# Patient Record
Sex: FEMALE | Race: WHITE | Employment: UNEMPLOYED | URBAN - METROPOLITAN AREA
[De-identification: names, ages, dates, MRNs, and addresses within clinical notes are randomized per-mention and may not be internally consistent; named-entity substitution may affect disease eponyms.]

---

## 2017-02-03 ENCOUNTER — OFFICE VISIT (OUTPATIENT)
Dept: OBGYN | Facility: CLINIC | Age: 32
End: 2017-02-03
Payer: COMMERCIAL

## 2017-02-03 VITALS
HEART RATE: 64 BPM | BODY MASS INDEX: 22.68 KG/M2 | DIASTOLIC BLOOD PRESSURE: 73 MMHG | HEIGHT: 63 IN | TEMPERATURE: 97.2 F | WEIGHT: 128 LBS | SYSTOLIC BLOOD PRESSURE: 119 MMHG | OXYGEN SATURATION: 97 %

## 2017-02-03 DIAGNOSIS — Z13.220 SCREENING FOR LIPOID DISORDERS: ICD-10-CM

## 2017-02-03 DIAGNOSIS — Z13.1 SCREENING FOR DIABETES MELLITUS: ICD-10-CM

## 2017-02-03 DIAGNOSIS — Z01.419 ENCOUNTER FOR GYNECOLOGICAL EXAMINATION WITHOUT ABNORMAL FINDING: Primary | ICD-10-CM

## 2017-02-03 LAB
ERYTHROCYTE [DISTWIDTH] IN BLOOD BY AUTOMATED COUNT: 13.3 % (ref 10–15)
HBA1C MFR BLD: 5.4 % (ref 4.3–6)
HCT VFR BLD AUTO: 41.2 % (ref 35–47)
HGB BLD-MCNC: 13.5 G/DL (ref 11.7–15.7)
MCH RBC QN AUTO: 29.9 PG (ref 26.5–33)
MCHC RBC AUTO-ENTMCNC: 32.8 G/DL (ref 31.5–36.5)
MCV RBC AUTO: 91 FL (ref 78–100)
PLATELET # BLD AUTO: 273 10E9/L (ref 150–450)
RBC # BLD AUTO: 4.51 10E12/L (ref 3.8–5.2)
WBC # BLD AUTO: 6.3 10E9/L (ref 4–11)

## 2017-02-03 PROCEDURE — 83036 HEMOGLOBIN GLYCOSYLATED A1C: CPT | Performed by: OBSTETRICS & GYNECOLOGY

## 2017-02-03 PROCEDURE — 80061 LIPID PANEL: CPT | Performed by: OBSTETRICS & GYNECOLOGY

## 2017-02-03 PROCEDURE — 85027 COMPLETE CBC AUTOMATED: CPT | Performed by: OBSTETRICS & GYNECOLOGY

## 2017-02-03 PROCEDURE — 82947 ASSAY GLUCOSE BLOOD QUANT: CPT | Performed by: OBSTETRICS & GYNECOLOGY

## 2017-02-03 PROCEDURE — 99395 PREV VISIT EST AGE 18-39: CPT | Performed by: OBSTETRICS & GYNECOLOGY

## 2017-02-03 PROCEDURE — 36415 COLL VENOUS BLD VENIPUNCTURE: CPT | Performed by: OBSTETRICS & GYNECOLOGY

## 2017-02-03 RX ORDER — NORGESTIMATE AND ETHINYL ESTRADIOL 0.25-0.035
1 KIT ORAL DAILY
Qty: 84 TABLET | Refills: 3 | Status: SHIPPED | OUTPATIENT
Start: 2017-02-03 | End: 2017-12-26

## 2017-02-04 LAB
CHOLEST SERPL-MCNC: 170 MG/DL
GLUCOSE SERPL-MCNC: 77 MG/DL (ref 70–99)
HDLC SERPL-MCNC: 38 MG/DL
LDLC SERPL CALC-MCNC: 105 MG/DL
NONHDLC SERPL-MCNC: 132 MG/DL
TRIGL SERPL-MCNC: 133 MG/DL

## 2017-12-26 DIAGNOSIS — Z01.419 ENCOUNTER FOR GYNECOLOGICAL EXAMINATION WITHOUT ABNORMAL FINDING: ICD-10-CM

## 2017-12-26 RX ORDER — NORGESTIMATE AND ETHINYL ESTRADIOL 0.25-0.035
1 KIT ORAL DAILY
Qty: 84 TABLET | Refills: 3 | Status: SHIPPED | OUTPATIENT
Start: 2017-12-26 | End: 2019-02-04

## 2018-01-05 ENCOUNTER — OFFICE VISIT (OUTPATIENT)
Dept: OBGYN | Facility: CLINIC | Age: 33
End: 2018-01-05
Payer: COMMERCIAL

## 2018-01-05 VITALS
TEMPERATURE: 97.4 F | DIASTOLIC BLOOD PRESSURE: 81 MMHG | SYSTOLIC BLOOD PRESSURE: 131 MMHG | WEIGHT: 127.4 LBS | OXYGEN SATURATION: 98 % | HEART RATE: 85 BPM | HEIGHT: 63 IN | BODY MASS INDEX: 22.57 KG/M2

## 2018-01-05 DIAGNOSIS — Z01.419 ENCOUNTER FOR GYNECOLOGICAL EXAMINATION WITHOUT ABNORMAL FINDING: Primary | ICD-10-CM

## 2018-01-05 PROCEDURE — 99395 PREV VISIT EST AGE 18-39: CPT | Performed by: OBSTETRICS & GYNECOLOGY

## 2018-01-05 RX ORDER — NORGESTIMATE AND ETHINYL ESTRADIOL 7DAYSX3 28
1 KIT ORAL DAILY
Qty: 84 TABLET | Refills: 3 | Status: SHIPPED | OUTPATIENT
Start: 2018-01-05 | End: 2018-12-17

## 2018-01-05 NOTE — PROGRESS NOTES
Ella is a 32 year old  female who presents for annual exam.     Menses are regular q 28-30 days and normal lasting 3-7 days.  Menses flow: normal.  Patient's last menstrual period was 2017.. Using oral contraceptives for contraception.  She is not currently considering pregnancy.  Besides routine health maintenance, she has no other health concerns today .  Has been bleeding on last week of active tablets on OCP so not sure why.  Boys are now in 4th grade, 4 y/o and 3 y/o and all doing well.  GYNECOLOGIC HISTORY:  Menarche:   Ella is sexually active with 1male partner(s) and is currently in monogamous relationship.    History sexually transmitted infections:Trichomonas  STI testing offered?  Declined  KANA exposure: Unknown  History of abnormal Pap smear: NO - age 30- 65 PAP every 3 years recommended  Family history of breast CA: Yes (Please explain): mgm  Family history of uterine/ovarian CA: No    Family history of colon CA: No    HEALTH MAINTENANCE:  Cholesterol: (  Cholesterol   Date Value Ref Range Status   2017 170 <200 mg/dL Final   2014 164 <200 mg/dL Final     Comment:     LDL Cholesterol is the primary guide to therapy.   The NCEP recommends further evaluation of: patients with cholesterol greater   than 200 mg/dL if additional risk factors are present, cholesterol greater   than   240 mg/dL, triglycerides greater than 150 mg/dL, or HDL less than 40 mg/dL.      History of abnormal lipids: Yes  Mammo: na . History of abnormal Mammo: Not applicable.  Regular Self Breast Exams: No  Calcium/Vitamin D intake: source:  dairy Adequate? Yes  TSH: (No results found for: TSH )  Pap; (  Lab Results   Component Value Date    PAP NIL 2015    PAP NIL 2012    )    HISTORY:  Obstetric History       T3      L3     SAB0   TAB0   Ectopic1   Multiple0   Live Births3       # Outcome Date GA Lbr Rakesh/2nd Weight Sex Delivery Anes PTL Lv   4 Term 11/23/15 37w2d  7 lb 10 oz  (3.459 kg) M CS-LTranv Spinal N LYN      Name: Joe      Apgar1:  8                Apgar5: 8   3 Term 12 37w3d  8 lb 8 oz (3.856 kg) M CS-LTranv Spinal N LYN      Name: Cristian      Apgar1:  8                Apgar5: 9   2 Ectopic 11           1 Term  40w0d  8 lb 2 oz (3.685 kg) M Vag-Vacuum EPI  LYN      Name: Constantine        Past Medical History:   Diagnosis Date     Diabetes mellitus of mother, complicating pregnancy, childbirth, or the puerperium, unspecified as to episode of care(648.00) 2015    Gestational diabetes--glyburide     Past Surgical History:   Procedure Laterality Date      SECTION  2012    Procedure:  SECTION;   section ;  Surgeon: Isabel Chaparro MD;  [facility]      SECTION N/A 2015    Procedure:  SECTION;  Surgeon: Isabel Chaparro MD;  [facility]     LAPAROSCOPY DIAGNOSTIC (GYN)  2011    Procedure:LAPAROSCOPY DIAGNOSTIC (GYN); Surgeon:ISABEL CHAPARRO; Location:[facility]     LAPAROTOMY EXPLORATORY  2011    cornual ectopic with wedge resection     Family History   Problem Relation Age of Onset     C.A.D. Paternal Grandfather      CEREBROVASCULAR DISEASE Paternal Grandfather      Breast Cancer Maternal Grandmother      in later 50's     Social History     Social History     Marital status:      Spouse name: N/A     Number of children: 3     Years of education: N/A     Occupational History      None      Social History Main Topics     Smoking status: Never Smoker     Smokeless tobacco: Never Used     Alcohol use No     Drug use: No     Sexual activity: Yes     Partners: Male     Birth control/ protection:      Other Topics Concern     Not on file         Current Outpatient Prescriptions:      norgestimate-ethinyl estradiol (ORTHO-CYCLEN, SPRINTEC) 0.25-35 MG-MCG per tablet, Take 1 tablet by mouth daily, Disp: 84 tablet, Rfl: 3  No current facility-administered medications for this visit.  "    Facility-Administered Medications Ordered in Other Visits:      bupivacaine 0.25 % - EPINEPHrine 1:200,000 injection, , , PRN, Weiland, Brian Forrester MD, 40 mL at 11/23/15 1000   No Known Allergies    Past medical, surgical, social and family history were reviewed and updated in EPIC.      EXAM:  /81  Pulse 85  Temp 97.4  F (36.3  C) (Oral)  Ht 5' 3\" (1.6 m)  Wt 127 lb 6.4 oz (57.8 kg)  LMP 01/05/2017  SpO2 98%  BMI 22.57 kg/m2   BMI: Body mass index is 22.57 kg/(m^2).  Constitutional: healthy, alert and no distress  Head: Normocephalic. No masses, lesions, tenderness or abnormalities  Neck: Neck supple. Trachea midline. No adenopathy. Thyroid symmetric, normal size.   Cardiovascular: RRR.   Respiratory: Negative.   Breast: No nodularity, asymmetry or nipple discharge bilaterally.  S/p implants bilaterally  Gastrointestinal: Abdomen soft, non-tender, non-distended. No masses, organomegaly.  :  Vulva:  No external lesions, normal female hair distribution, no inguinal adenopathy.    Urethra:  Midline, non-tender, well supported, no discharge  Vagina:  Moist, pink, no abnormal discharge, no lesions  Uterus:  Normal size , non-tender, freely mobile  Ovaries:  No masses appreciated, non-tender, mobile  Rectal Exam: deferred  Musculoskeletal: extremities normal  Skin: no suspicious lesions or rashes  Psychiatric: Affect appropriate, cooperative,mentation appears normal.     COUNSELING:   Reviewed preventive health counseling, as reflected in patient instructions       Contraception   reports that she has never smoked. She has never used smokeless tobacco.    Body mass index is 22.57 kg/(m^2).      FRAX Risk Assessment    ASSESSMENT:  32 year old female with satisfactory annual exam  (Z01.419) Encounter for gynecological examination without abnormal finding  (primary encounter diagnosis)  Comment: OCP  Plan: norgestim-eth estrad triphasic (TRINESSA, 28,)         0.18/0.215/0.25 MG-35 MCG per tablet      "   Will try a different OCP to see if works better at preventing bleeding at strange times and she will let me know if not working.    Pap due next year.  Labs reviewed, will need repeat fasting cholesterol done next year or year after.       Isabel Chaparro MD

## 2018-01-05 NOTE — MR AVS SNAPSHOT
"              After Visit Summary   1/5/2018    Ella Palacios    MRN: [id number]           Patient Information     Date Of Birth          1985        Visit Information        Provider Department      1/5/2018 11:30 AM Isabel Chaparro [place] [facility] [place]        Today's Diagnoses     Encounter for gynecological examination without abnormal finding    -  1       Follow-ups after your visit        Who to contact     If you have questions or need follow up information about today's clinic visit or your schedule please contact [facility] directly at [phone].  Normal or non-critical lab and imaging results will be communicated to you by Solaveihart, letter or phone within 4 business days after the clinic has received the results. If you do not hear from us within 7 days, please contact the clinic through Solaveihart or phone. If you have a critical or abnormal lab result, we will notify you by phone as soon as possible.  Submit refill requests through Easy Eye or call your pharmacy and they will forward the refill request to us. Please allow 3 business days for your refill to be completed.          Additional Information About Your Visit        MyChart Information     Easy Eye gives you secure access to your electronic health record. If you see a primary care provider, you can also send messages to your care team and make appointments. If you have questions, please call your primary care clinic.  If you do not have a primary care provider, please call [phone] and they will assist you.        Care EveryWhere ID     This is your Care EveryWhere ID. This could be used by other organizations to access your [place] medical records  [id number]        Your Vitals Were     Pulse Temperature Height Last Period Pulse Oximetry BMI (Body Mass Index)    85 97.4  F (36.3  C) (Oral) 5' 3\" (1.6 m) 01/05/2017 98% 22.57 kg/m2       Blood Pressure from Last 3 Encounters:   01/05/18 131/81 "   02/03/17 119/73   12/28/15 115/69    Weight from Last 3 Encounters:   01/05/18 127 lb 6.4 oz (57.8 kg)   02/03/17 128 lb (58.1 kg)   12/28/15 124 lb 9.6 oz (56.5 kg)              Today, you had the following     No orders found for display         Today's Medication Changes          These changes are accurate as of: 1/5/18 12:01 PM.  If you have any questions, ask your nurse or doctor.               Start taking these medicines.        Dose/Directions    norgestim-eth estrad triphasic 0.18/0.215/0.25 MG-35 MCG per tablet   Commonly known as:  TRINESSA (28)   Used for:  Encounter for gynecological examination without abnormal finding   Started by:  Isabel Chaparro MD        Dose:  1 tablet   Take 1 tablet by mouth daily   Quantity:  84 tablet   Refills:  3            Where to get your medicines      These medications were sent to [facility]  [place] - [address] AT [facility]  [address] [place] [address] [place] [address]     Phone:  [phone]     norgestim-eth estrad triphasic 0.18/0.215/0.25 MG-35 MCG per tablet                Primary Care Provider Fax #    Physician No Ref-Primary [phone]       No address on file        Equal Access to Services     JESUS COOPER AH: Riddhi villalta Solashay, waaxda luqadaha, qaybta kaalclari cárdenas. So [facility] [phone].    ATENCIÓN: Si habla español, tiene a asher disposición servicios gratuitos de asistencia lingüística. Aisha al [phone].    We comply with applicable federal civil rights laws and [place] laws. We do not discriminate on the basis of race, color, national origin, age, disability, sex, sexual orientation, or gender identity.            Thank you!     Thank you for choosing [facility]  for your care. Our goal is always to provide you with excellent care. Hearing back from our patients is one way we can continue to improve our services. Please take a  few minutes to complete the written survey that you may receive in the mail after your visit with us. Thank you!             Your Updated Medication List - Protect others around you: Learn how to safely use, store and throw away your medicines at www.disposemymeds.org.          This list is accurate as of: 1/5/18 12:01 PM.  Always use your most recent med list.                   Brand Name Dispense Instructions for use Diagnosis    norgestim-eth estrad triphasic 0.18/0.215/0.25 MG-35 MCG per tablet    TRINESSA (28)    84 tablet    Take 1 tablet by mouth daily    Encounter for gynecological examination without abnormal finding       norgestimate-ethinyl estradiol 0.25-35 MG-MCG per tablet    ORTHO-CYCLEN, SPRINTEC    84 tablet    Take 1 tablet by mouth daily    Encounter for gynecological examination without abnormal finding

## 2018-01-05 NOTE — NURSING NOTE
"Chief Complaint   Patient presents with     Physical       Initial /81  Pulse 85  Temp 97.4  F (36.3  C) (Oral)  Ht 5' 3\" (1.6 m)  Wt 127 lb 6.4 oz (57.8 kg)  LMP 2017  SpO2 98%  BMI 22.57 kg/m2 Estimated body mass index is 22.57 kg/(m^2) as calculated from the following:    Height as of this encounter: 5' 3\" (1.6 m).    Weight as of this encounter: 127 lb 6.4 oz (57.8 kg).  BP completed using cuff size: regular        The following HM Due: NONE      The following patient reported/Care Every where data was sent to:  P ABSTRACT QUALITY INITIATIVES [[id number]]  none      patient has appointment for today              "

## 2018-07-23 ENCOUNTER — HEALTH MAINTENANCE LETTER (OUTPATIENT)
Age: 33
End: 2018-07-23

## 2018-12-17 DIAGNOSIS — Z01.419 ENCOUNTER FOR GYNECOLOGICAL EXAMINATION WITHOUT ABNORMAL FINDING: ICD-10-CM

## 2018-12-17 RX ORDER — NORGESTIMATE AND ETHINYL ESTRADIOL 7DAYSX3 28
1 KIT ORAL DAILY
Qty: 84 TABLET | Refills: 0 | Status: SHIPPED | OUTPATIENT
Start: 2018-12-17 | End: 2019-02-04

## 2018-12-17 NOTE — TELEPHONE ENCOUNTER
Pending Prescriptions:                       Disp   Refills    norgestim-eth estrad triphasic (TRINESSA,*84 tab*0            Sig: Take 1 tablet by mouth daily    Last OV was 1/5/18. Pt has AE scheduled for 2/4/19. Refill sent to pharmacy.   Paz Jessica, RN-BSN

## 2019-02-04 ENCOUNTER — OFFICE VISIT (OUTPATIENT)
Dept: OBGYN | Facility: CLINIC | Age: 34
End: 2019-02-04
Payer: COMMERCIAL

## 2019-02-04 VITALS
HEART RATE: 62 BPM | WEIGHT: 126 LBS | SYSTOLIC BLOOD PRESSURE: 117 MMHG | HEIGHT: 63 IN | DIASTOLIC BLOOD PRESSURE: 73 MMHG | BODY MASS INDEX: 22.32 KG/M2 | OXYGEN SATURATION: 93 %

## 2019-02-04 DIAGNOSIS — Z12.4 SCREENING FOR CERVICAL CANCER: ICD-10-CM

## 2019-02-04 DIAGNOSIS — Z01.419 ENCOUNTER FOR GYNECOLOGICAL EXAMINATION WITHOUT ABNORMAL FINDING: Primary | ICD-10-CM

## 2019-02-04 PROCEDURE — G0476 HPV COMBO ASSAY CA SCREEN: HCPCS | Performed by: OBSTETRICS & GYNECOLOGY

## 2019-02-04 PROCEDURE — 99395 PREV VISIT EST AGE 18-39: CPT | Performed by: OBSTETRICS & GYNECOLOGY

## 2019-02-04 PROCEDURE — G0145 SCR C/V CYTO,THINLAYER,RESCR: HCPCS | Performed by: OBSTETRICS & GYNECOLOGY

## 2019-02-04 RX ORDER — NORGESTIMATE AND ETHINYL ESTRADIOL 7DAYSX3 28
1 KIT ORAL DAILY
Qty: 84 TABLET | Refills: 3 | Status: SHIPPED | OUTPATIENT
Start: 2019-02-04 | End: 2019-11-06

## 2019-02-04 ASSESSMENT — ANXIETY QUESTIONNAIRES
3. WORRYING TOO MUCH ABOUT DIFFERENT THINGS: NOT AT ALL
GAD7 TOTAL SCORE: 0
6. BECOMING EASILY ANNOYED OR IRRITABLE: NOT AT ALL
7. FEELING AFRAID AS IF SOMETHING AWFUL MIGHT HAPPEN: NOT AT ALL
5. BEING SO RESTLESS THAT IT IS HARD TO SIT STILL: NOT AT ALL
IF YOU CHECKED OFF ANY PROBLEMS ON THIS QUESTIONNAIRE, HOW DIFFICULT HAVE THESE PROBLEMS MADE IT FOR YOU TO DO YOUR WORK, TAKE CARE OF THINGS AT HOME, OR GET ALONG WITH OTHER PEOPLE: NOT DIFFICULT AT ALL
2. NOT BEING ABLE TO STOP OR CONTROL WORRYING: NOT AT ALL
1. FEELING NERVOUS, ANXIOUS, OR ON EDGE: NOT AT ALL

## 2019-02-04 ASSESSMENT — PATIENT HEALTH QUESTIONNAIRE - PHQ9
5. POOR APPETITE OR OVEREATING: NOT AT ALL
SUM OF ALL RESPONSES TO PHQ QUESTIONS 1-9: 0

## 2019-02-04 ASSESSMENT — MIFFLIN-ST. JEOR: SCORE: 1240.66

## 2019-02-04 NOTE — NURSING NOTE
"Chief Complaint   Patient presents with     Physical       Initial /73 (BP Location: Left arm, Patient Position: Sitting, Cuff Size: Adult Regular)   Pulse 62   Ht 1.6 m (5' 3\")   Wt 57.2 kg (126 lb)   LMP 2019   SpO2 93%   Breastfeeding? No   BMI 22.32 kg/m   Estimated body mass index is 22.32 kg/m  as calculated from the following:    Height as of this encounter: 1.6 m (5' 3\").    Weight as of this encounter: 57.2 kg (126 lb).  BP completed using cuff size: regular    Questioned patient about current smoking habits.  Pt. has never smoked.          The following HM Due: pap smear      The following patient reported/Care Every where data was sent to:  P ABSTRACT QUALITY INITIATIVES [[id number]]  N/a       patient has appointment for today    Nisha Nelson MA on 2019 at 11:40 AM                "

## 2019-02-04 NOTE — PROGRESS NOTES
Ella is a 32 year old  female who presents for annual exam.      Menses are regular q 28-30 days and normal lasting 3-7 days.  Menses flow: normal.  Patient's last menstrual period was 2018. Using oral contraceptives for contraception.  She is not currently considering pregnancy.  Besides routine health maintenance, she has no other health concerns today .  This birth control pill is working better than the last one and needs a refill.  Boys are now in 5th grade, 7 y/o and 3 y/o and all doing well.  She is doing well and happy.  GYNECOLOGIC HISTORY:  Menarche: 11   Age or 1st intercourse: 15 Lifetime partners: 02  Ella is sexually active with 1male partner(s) and is currently in monogamous relationship.    History sexually transmitted infections:Trichomonas  STI testing offered?  Declined  KANA exposure: Unknown  History of abnormal Pap smear: NO - age 30- 65 PAP every 3 years recommended  Family history of breast CA: Yes (Please explain): mgm  Family history of uterine/ovarian CA: No     Family history of colon CA: No  HEALTH MAINTENANCE:  Cholesterol: (  Cholesterol   Date Value Ref Range Status   2017 170 <200 mg/dL Final   2014 164 <200 mg/dL Final     Comment:     LDL Cholesterol is the primary guide to therapy.   The NCEP recommends further evaluation of: patients with cholesterol greater   than 200 mg/dL if additional risk factors are present, cholesterol greater   than   240 mg/dL, triglycerides greater than 150 mg/dL, or HDL less than 40 mg/dL.      History of abnormal lipids: Yes  Mammo: n/a . History of abnormal Mammo: No.  Regular Self Breast Exams: No  Calcium/Vitamin D intake: source:  dairy Adequate? Yes  TSH: (No results found for: TSH )  Pap; (  Lab Results   Component Value Date    PAP NIL 2015    PAP NIL 2012    )    HISTORY:  Obstetric History       T3      L3     SAB0   TAB0   Ectopic1   Multiple0   Live Births3       # Outcome Date GA  Lbr Rakesh/2nd Weight Sex Delivery Anes PTL Lv   4 Term 11/23/15 37w2d  3.459 kg (7 lb 10 oz) M CS-LTranv Spinal N LYN      Name: Joe      Apgar1:  8                Apgar5: 8   3 Term 12 37w3d  3.856 kg (8 lb 8 oz) M CS-LTranv Spinal N LYN      Name: Cristian      Apgar1:  8                Apgar5: 9   2 Ectopic 11           1 Term  40w0d  3.685 kg (8 lb 2 oz) M Vag-Vacuum EPI  LYN      Name: Constantine        Past Medical History:   Diagnosis Date     Diabetes mellitus of mother, complicating pregnancy, childbirth, or the puerperium, unspecified as to episode of care(648.00) 2015    Gestational diabetes--glyburide     Past Surgical History:   Procedure Laterality Date     C BREAST AUGMENTATION  2017    silicone      SECTION  2012    Procedure:  SECTION;   section ;  Surgeon: Isabel Chaparro MD;  [facility]      SECTION N/A 2015    Procedure:  SECTION;  Surgeon: Isabel Chaparro MD;  [facility]     LAPAROSCOPY DIAGNOSTIC (GYN)  2011    Procedure:LAPAROSCOPY DIAGNOSTIC (GYN); Surgeon:ISABEL CHAPARRO; [facility]     LAPAROTOMY EXPLORATORY  2011    cornual ectopic with wedge resection     Family History   Problem Relation Age of Onset     C.A.D. 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 Not Asked   Social History Narrative                               Current Outpatient Medications:      norgestim-eth estrad triphasic (TRINESSA, 28,) 0.18/0.215/0.25 MG-35 MCG tablet, Take 1 tablet by mouth daily, Disp: 84 tablet, Rfl: 3  No current facility-administered medications for this visit.     Facility-Administered Medications Ordered in Other Visits:      bupivacaine 0.25 % - EPINEPHrine 1:200,000 injection, , , PRN, Weiland, Brian Forrester MD, 40 mL at 11/23/15 1000   No Known Allergies    Past medical, surgical, social and family history were reviewed and updated in EPIC.      EXAM:  /73 (BP Location: Left arm, Patient Position: Sitting, Cuff Size: Adult Regular)   Pulse 62   Ht 1.6 m (5' 3\")   Wt 57.2 kg (126 lb)   LMP 02/03/2019   SpO2 93%   Breastfeeding? No   BMI 22.32 kg/m     BMI: Body mass index is 22.32 kg/m .  Constitutional: healthy, alert and no distress  Head: Normocephalic. No masses, lesions, tenderness or abnormalities  Neck: Neck supple. Trachea midline. No adenopathy. Thyroid symmetric, normal size.   Cardiovascular: RRR.   Respiratory: Negative.   Breast: No nodularity, asymmetry or nipple discharge bilaterally. S/p implants bilaterally  Gastrointestinal: Abdomen soft, non-tender, non-distended. No masses, organomegaly.  :  Vulva:  No external lesions, normal female hair distribution, no inguinal adenopathy.    Urethra:  Midline, non-tender, well supported, no discharge  Vagina:  Moist, pink, blood from menses, no lesions  Uterus:  Normal size , non-tender, freely mobile  Ovaries:  No masses appreciated, non-tender, mobile  Rectal Exam: deferred  Musculoskeletal: extremities normal  Skin: no suspicious lesions or rashes  Psychiatric: Affect " appropriate, cooperative,mentation appears normal.     COUNSELING:   Reviewed preventive health counseling, as reflected in patient instructions   reports that  has never smoked. she has never used smokeless tobacco.    Body mass index is 22.32 kg/m .    FRAX Risk Assessment    ASSESSMENT:  33 year old female with satisfactory annual exam  (Z01.419) Encounter for gynecological examination without abnormal finding  (primary encounter diagnosis)  Comment: OCP  Plan: norgestim-eth estrad triphasic (TRINESSA, 28,)         0.18/0.215/0.25 MG-35 MCG tablet        Refill of pill was done.    Will need fasting labs done in 2020 including cholesterol and glucose for history of GDM.    (Z12.4) Screening for cervical cancer  Plan: Pap imaged thin layer screen with HPV -         recommended age 30 - 65 years (select HPV order        below), HPV High Risk Types DNA Cervical        Discussed sending pap smear for HPV typing as well and that if both pap and HPV are negative, then can have q5 year pap smears.      Isabel Chaparro MD

## 2019-02-05 ASSESSMENT — ANXIETY QUESTIONNAIRES: GAD7 TOTAL SCORE: 0

## 2019-02-06 LAB
COPATH REPORT: NORMAL
PAP: NORMAL

## 2019-02-07 LAB
FINAL DIAGNOSIS: NORMAL
HPV HR 12 DNA CVX QL NAA+PROBE: NEGATIVE
HPV16 DNA SPEC QL NAA+PROBE: NEGATIVE
HPV18 DNA SPEC QL NAA+PROBE: NEGATIVE
SPECIMEN DESCRIPTION: NORMAL
SPECIMEN SOURCE CVX/VAG CYTO: NORMAL

## 2019-11-06 DIAGNOSIS — Z01.419 ENCOUNTER FOR GYNECOLOGICAL EXAMINATION WITHOUT ABNORMAL FINDING: ICD-10-CM

## 2019-11-06 RX ORDER — NORGESTIMATE AND ETHINYL ESTRADIOL 7DAYSX3 28
1 KIT ORAL DAILY
Qty: 84 TABLET | Refills: 1 | Status: SHIPPED | OUTPATIENT
Start: 2019-11-06 | End: 2020-02-10

## 2019-11-06 NOTE — TELEPHONE ENCOUNTER
Signed Prescriptions:                        Disp   Refills    norgestim-eth estrad triphasic (TRINESSA, *84 tab*1        Sig: Take 1 tablet by mouth daily  Authorizing Provider: SUNIL COOLEY  Ordering User: CELINE GUILLEN    Up to date on AE - rx sent.  Celine Guillen RN

## 2020-02-10 ENCOUNTER — TELEPHONE (OUTPATIENT)
Dept: OBGYN | Facility: CLINIC | Age: 35
End: 2020-02-10

## 2020-02-10 DIAGNOSIS — Z01.419 ENCOUNTER FOR GYNECOLOGICAL EXAMINATION WITHOUT ABNORMAL FINDING: ICD-10-CM

## 2020-02-10 RX ORDER — NORGESTIMATE AND ETHINYL ESTRADIOL 7DAYSX3 28
1 KIT ORAL DAILY
Qty: 84 TABLET | Refills: 0 | Status: SHIPPED | OUTPATIENT
Start: 2020-02-10 | End: 2020-02-21

## 2020-02-10 NOTE — TELEPHONE ENCOUNTER
Reason for call:  Other   Patient called regarding (reason for call): call back  Additional comments: Patient called and would like a prescription for birth control pills. Please give patient a call back    Phone number to reach patient:  Cell number on file:    Telephone Information:   Mobile [phone]       Best Time:  na    Can we leave a detailed message on this number?  YES

## 2020-02-10 NOTE — TELEPHONE ENCOUNTER
Signed Prescriptions:                        Disp   Refills    norgestim-eth estrad triphasic (TRINESSA, *84 tab*0        Sig: Take 1 tablet by mouth daily  Authorizing Provider: SUNIL COOLEY  Ordering User: ANJUM JEFFERSON    Last OV was 2/4/19. Pt is scheduled for AE on 2/21/20. TC to patient. Refill sent to pharmacy.   Anjum Jefferson, RN-BSN

## 2020-02-21 ENCOUNTER — OFFICE VISIT (OUTPATIENT)
Dept: OBGYN | Facility: CLINIC | Age: 35
End: 2020-02-21
Payer: COMMERCIAL

## 2020-02-21 VITALS
HEIGHT: 63 IN | OXYGEN SATURATION: 99 % | DIASTOLIC BLOOD PRESSURE: 80 MMHG | SYSTOLIC BLOOD PRESSURE: 117 MMHG | HEART RATE: 78 BPM | WEIGHT: 138 LBS | BODY MASS INDEX: 24.45 KG/M2

## 2020-02-21 DIAGNOSIS — Z13.220 SCREENING FOR LIPID DISORDERS: ICD-10-CM

## 2020-02-21 DIAGNOSIS — Z01.419 ENCOUNTER FOR GYNECOLOGICAL EXAMINATION WITHOUT ABNORMAL FINDING: Primary | ICD-10-CM

## 2020-02-21 DIAGNOSIS — Z13.1 SCREENING FOR DIABETES MELLITUS: ICD-10-CM

## 2020-02-21 DIAGNOSIS — Z13.29 SCREENING FOR THYROID DISORDER: ICD-10-CM

## 2020-02-21 LAB
CHOLEST SERPL-MCNC: 213 MG/DL
ERYTHROCYTE [DISTWIDTH] IN BLOOD BY AUTOMATED COUNT: 13 % (ref 10–15)
GLUCOSE SERPL-MCNC: 86 MG/DL (ref 70–99)
HBA1C MFR BLD: 5.5 % (ref 0–5.6)
HCT VFR BLD AUTO: 40.1 % (ref 35–47)
HDLC SERPL-MCNC: 45 MG/DL
HGB BLD-MCNC: 13.4 G/DL (ref 11.7–15.7)
LDLC SERPL CALC-MCNC: 133 MG/DL
MCH RBC QN AUTO: 30.1 PG (ref 26.5–33)
MCHC RBC AUTO-ENTMCNC: 33.4 G/DL (ref 31.5–36.5)
MCV RBC AUTO: 90 FL (ref 78–100)
NONHDLC SERPL-MCNC: 168 MG/DL
PLATELET # BLD AUTO: 268 10E9/L (ref 150–450)
RBC # BLD AUTO: 4.45 10E12/L (ref 3.8–5.2)
TRIGL SERPL-MCNC: 173 MG/DL
TSH SERPL DL<=0.005 MIU/L-ACNC: 1.72 MU/L (ref 0.4–4)
WBC # BLD AUTO: 7.7 10E9/L (ref 4–11)

## 2020-02-21 PROCEDURE — 87491 CHLMYD TRACH DNA AMP PROBE: CPT | Performed by: OBSTETRICS & GYNECOLOGY

## 2020-02-21 PROCEDURE — 36415 COLL VENOUS BLD VENIPUNCTURE: CPT | Performed by: OBSTETRICS & GYNECOLOGY

## 2020-02-21 PROCEDURE — 80061 LIPID PANEL: CPT | Performed by: OBSTETRICS & GYNECOLOGY

## 2020-02-21 PROCEDURE — 87591 N.GONORRHOEAE DNA AMP PROB: CPT | Performed by: OBSTETRICS & GYNECOLOGY

## 2020-02-21 PROCEDURE — 85027 COMPLETE CBC AUTOMATED: CPT | Performed by: OBSTETRICS & GYNECOLOGY

## 2020-02-21 PROCEDURE — 99395 PREV VISIT EST AGE 18-39: CPT | Performed by: OBSTETRICS & GYNECOLOGY

## 2020-02-21 PROCEDURE — 84443 ASSAY THYROID STIM HORMONE: CPT | Performed by: OBSTETRICS & GYNECOLOGY

## 2020-02-21 PROCEDURE — 82947 ASSAY GLUCOSE BLOOD QUANT: CPT | Performed by: OBSTETRICS & GYNECOLOGY

## 2020-02-21 PROCEDURE — 83036 HEMOGLOBIN GLYCOSYLATED A1C: CPT | Performed by: OBSTETRICS & GYNECOLOGY

## 2020-02-21 RX ORDER — NORGESTIMATE AND ETHINYL ESTRADIOL 7DAYSX3 28
1 KIT ORAL DAILY
Qty: 84 TABLET | Refills: 4 | Status: SHIPPED | OUTPATIENT
Start: 2020-02-21 | End: 2020-07-14

## 2020-02-21 ASSESSMENT — MIFFLIN-ST. JEOR: SCORE: 1285.09

## 2020-02-21 NOTE — PROGRESS NOTES
Ella is a 35 year old  female who presents for annual exam.     Menses are regular q 28-30 days and normal lasting 7 days.  Menses flow: normal.  No LMP recorded.. Using oral contraceptives for contraception.  She is not currently considering pregnancy.  Besides routine health maintenance, she has no other health concerns today .  Boys are now 12, 7 and 4 years old.  Is fasting today for lab work and would also like gonorrhea and Chlamydia testing as she and her spouse are still having some issues.  Weight is up 12 pounds since last visit and she is trying to work on that.  Doing well on OCP.  GYNECOLOGIC HISTORY:  Menarche:   Ella is sexually active with 1male partner(s) and is currently in monogamous relationship.    History sexually transmitted infections:Trichomonas  STI testing offered?  Accepted  KANA exposure: Unknown  History of abnormal Pap smear: NO - age 30-65 PAP every 5 years with negative HPV co-testing recommended  Family history of breast CA: Yes (Please explain): mgm  Family history of uterine/ovarian CA: No    Family history of colon CA: No    HEALTH MAINTENANCE:  Cholesterol: (  Cholesterol   Date Value Ref Range Status   2017 170 <200 mg/dL Final   2014 164 <200 mg/dL Final     Comment:     LDL Cholesterol is the primary guide to therapy.   The NCEP recommends further evaluation of: patients with cholesterol greater   than 200 mg/dL if additional risk factors are present, cholesterol greater   than   240 mg/dL, triglycerides greater than 150 mg/dL, or HDL less than 40 mg/dL.      History of abnormal lipids: Yes  Mammo: na . History of abnormal Mammo: Not applicable.  Regular Self Breast Exams: sometime  Calcium/Vitamin D intake: source:  dairy Adequate? Yes  TSH: (No results found for: TSH )  Pap; (  Lab Results   Component Value Date    PAP NIL 2019    PAP NIL 2015    PAP NIL 2012    )    HISTORY:  OB History    Para Term  AB Living   4 3  3 0 1 3   SAB TAB Ectopic Multiple Live Births   0 0 1 0 3      # Outcome Date GA Lbr Rakesh/2nd Weight Sex Delivery Anes PTL Lv   4 Term 11/23/15 37w2d  3.459 kg (7 lb 10 oz) M CS-LTranv Spinal N LYN      Name: Joe      Apgar1: 8  Apgar5: 8   3 Term 12 37w3d  3.856 kg (8 lb 8 oz) M CS-LTranv Spinal N LYN      Birth Comments: c/s due to wedge resection      Name: Cristian      Apgar1: 8  Apgar5: 9   2 Ectopic 11              Birth Comments: cornual ectopic   1 Term  40w0d  3.685 kg (8 lb 2 oz) M Vag-Vacuum EPI  LYN      Birth Comments: chorio, thick mec      Name: Constantine     Past Medical History:   Diagnosis Date     Diabetes mellitus of mother, complicating pregnancy, childbirth, or the puerperium, unspecified as to episode of care(648.00) 2015    Gestational diabetes--glyburide     Past Surgical History:   Procedure Laterality Date     C BREAST AUGMENTATION  2017    silicone      SECTION  2012    Procedure:  SECTION;   section ;  Surgeon: Isabel Chaparro MD;  Location: [facility]      SECTION N/A 2015    Procedure:  SECTION;  Surgeon: Isabel Chaparro MD;  Location: [facility]     LAPAROSCOPY DIAGNOSTIC (GYN)  2011    Procedure:LAPAROSCOPY DIAGNOSTIC (GYN); Surgeon:ISABEL CHAPARRO; Location:[facility] OR     LAPAROTOMY EXPLORATORY  2011    cornual ectopic with wedge resection     Family History   Problem Relation Age of Onset     C.A.D. 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     Employer: NONE    Social Needs     Financial resource strain: None     Food insecurity:     Worry: None     Inability: None      "Transportation needs:     Medical: None     Non-medical: None   Tobacco Use     Smoking status: Never Smoker     Smokeless tobacco: Never Used   Substance and Sexual Activity     Alcohol use: No     Drug use: No     Sexual activity: Yes     Partners: Male     Birth control/protection: Pill   Lifestyle     Physical activity:     Days per week: None     Minutes per session: None     Stress: None   Relationships     Social connections:     Talks on phone: None     Gets together: None     Attends Pentecostal service: None     Active member of club or organization: None     Attends meetings of clubs or organizations: None     Relationship status: None     Intimate partner violence:     Fear of current or ex partner: None     Emotionally abused: None     Physically abused: None     Forced sexual activity: None   Other Topics Concern     Parent/sibling w/ CABG, MI or angioplasty before 65F 55M? Not Asked   Social History Narrative                               Current Outpatient Medications:      norgestim-eth estrad triphasic (TRINESSA, 28,) 0.18/0.215/0.25 MG-35 MCG tablet, Take 1 tablet by mouth daily, Disp: 84 tablet, Rfl: 0   No Known Allergies    Past medical, surgical, social and family history were reviewed and updated in [facility].    EXAM:  /80   Pulse 78   Ht 1.6 m (5' 3\")   Wt 62.6 kg (138 lb)   SpO2 99%   BMI 24.45 kg/m     BMI: Body mass index is 24.45 kg/m .  Constitutional: healthy, alert and no distress  Head: Normocephalic. No masses, lesions, tenderness or abnormalities  Neck: Neck supple. Trachea midline. No adenopathy. Thyroid symmetric, normal size.   Cardiovascular: RRR.   Respiratory: Negative.   Breast: No nodularity, asymmetry or nipple discharge bilaterally. S/p implants bilaterally  Gastrointestinal: Abdomen soft, non-tender, non-distended. No masses, organomegaly.  :  Vulva:  No external lesions, normal female hair distribution, no inguinal adenopathy.    Urethra:  Midline, non-tender, " well supported, no discharge  Vagina:  Moist, pink, no abnormal discharge, no lesions  Uterus:  Normal size , non-tender, freely mobile  Ovaries:  No masses appreciated, non-tender, mobile  Rectal Exam: deferred  Musculoskeletal: extremities normal  Skin: no suspicious lesions or rashes  Psychiatric: Affect appropriate, cooperative,mentation appears normal.     COUNSELING:   Reviewed preventive health counseling, as reflected in patient instructions   reports that she has never smoked. She has never used smokeless tobacco.    Body mass index is 24.45 kg/m .    FRAX Risk Assessment    ASSESSMENT:  35 year old female with satisfactory annual exam  (Z01.419) Encounter for gynecological examination without abnormal finding  (primary encounter diagnosis)  Comment: OCP  Plan: NEISSERIA GONORRHOEA PCR, CHLAMYDIA TRACHOMATIS        PCR, CBC with platelets, norgestim-eth estrad         triphasic (TRINESSA, 28,) 0.18/0.215/0.25 MG-35        MCG PO tablet        Refill of birth control pill was done.    (Z13.220) Screening for lipid disorders  Comment: Fasting  Plan: Lipid Profile        Check labs today    (Z13.1) Screening for diabetes mellitus  Comment: History of GDM  Plan: Glucose, Hemoglobin A1c        Check fasting labs today    (Z13.29) Screening for thyroid disorder  Plan: TSH with free T4 reflex        Check labs today        Isabel Chaparro MD

## 2020-02-23 LAB
C TRACH DNA SPEC QL NAA+PROBE: NEGATIVE
N GONORRHOEA DNA SPEC QL NAA+PROBE: NEGATIVE
SPECIMEN SOURCE: NORMAL
SPECIMEN SOURCE: NORMAL

## 2020-07-14 ENCOUNTER — PRENATAL OFFICE VISIT (OUTPATIENT)
Dept: NURSING | Facility: CLINIC | Age: 35
End: 2020-07-14
Payer: COMMERCIAL

## 2020-07-14 VITALS — BODY MASS INDEX: 23.92 KG/M2 | HEIGHT: 63 IN | WEIGHT: 135 LBS

## 2020-07-14 DIAGNOSIS — Z23 NEED FOR TDAP VACCINATION: ICD-10-CM

## 2020-07-14 DIAGNOSIS — O09.529 AMA (ADVANCED MATERNAL AGE) MULTIGRAVIDA 35+: Primary | ICD-10-CM

## 2020-07-14 PROBLEM — Z86.32 HISTORY OF GESTATIONAL DIABETES: Status: ACTIVE | Noted: 2017-03-01

## 2020-07-14 PROCEDURE — 99207 ZZC NO CHARGE NURSE ONLY: CPT

## 2020-07-14 SDOH — SOCIAL STABILITY: SOCIAL INSECURITY: WITHIN THE LAST YEAR, HAVE YOU BEEN AFRAID OF YOUR PARTNER OR EX-PARTNER?: NO

## 2020-07-14 SDOH — SOCIAL STABILITY: SOCIAL INSECURITY
WITHIN THE LAST YEAR, HAVE YOU BEEN KICKED, HIT, SLAPPED, OR OTHERWISE PHYSICALLY HURT BY YOUR PARTNER OR EX-PARTNER?: NO

## 2020-07-14 SDOH — SOCIAL STABILITY: SOCIAL INSECURITY: WITHIN THE LAST YEAR, HAVE YOU BEEN HUMILIATED OR EMOTIONALLY ABUSED IN OTHER WAYS BY YOUR PARTNER OR EX-PARTNER?: NO

## 2020-07-14 SDOH — SOCIAL STABILITY: SOCIAL NETWORK: ARE YOU MARRIED, WIDOWED, DIVORCED, SEPARATED, NEVER MARRIED, OR LIVING WITH A PARTNER?: MARRIED

## 2020-07-14 SDOH — HEALTH STABILITY: MENTAL HEALTH
STRESS IS WHEN SOMEONE FEELS TENSE, NERVOUS, ANXIOUS, OR CAN'T SLEEP AT NIGHT BECAUSE THEIR MIND IS TROUBLED. HOW STRESSED ARE YOU?: NOT AT ALL

## 2020-07-14 SDOH — SOCIAL STABILITY: SOCIAL INSECURITY
WITHIN THE LAST YEAR, HAVE TO BEEN RAPED OR FORCED TO HAVE ANY KIND OF SEXUAL ACTIVITY BY YOUR PARTNER OR EX-PARTNER?: NO

## 2020-07-14 ASSESSMENT — MIFFLIN-ST. JEOR: SCORE: 1271.49

## 2020-07-14 NOTE — PROGRESS NOTES
Important Information for Provider:     New ob teaching and labs, fifth pregnancy, AMA. History of 2 previous C sections. Ectopic .     Ordered first trimester screening/NIPT. Handouts reviewed and mailed. Patient has history of GDM with her pregnancies, she will monitor her glucose daily, watch her carbs per each meal/snacks. Has the GDM log from previous pregnancy. Ultrasound scheduled for 2020 with Dr Boyce to review after.( Check A1C )  NOB with Dr Chaparro 8/10/2020.  Patient to call if glucose numbers increase over 100. (before her appointment with [facility]).     Caffeine intake/servings daily - 1  Calcium intake/servings daily - 3  Exercise 5 times weekly - describe ;walks, precautions given  Sunscreen used - Yes  Seatbelts used - Yes  Guns stored in the home - No  Self Breast Exam - Yes  Pap test up to date -  Yes  Eye exam up to date -  Yes  Dental exam up to date -  Yes  Immunizations reviewed and up to date - Yes  Abuse: Current or Past (Physical, Sexual or Emotional) - No  Do you feel safe in your environment - Yes  Do you cope well with stress - Yes  Do you suffer from insomnia - No          Prenatal OB Questionnaire  Patient supplied answers from flow sheet for:  Prenatal OB Questionnaire.  Past Medical History  Diabetes?: (!) Yes(gestational diabetes)  Hypertension : No  Heart disease, mitral valve prolapse or rheumatic fever?: No  An autoimmune disease such as lupus or rheumatoid arthritis?: No  Kidney disease or urinary tract infection?: No  Epilepsy, seizures or spells?: No  Migraine headaches?: (!) Yes  A stroke or loss of function or sensation?: No  Any other neurological problems?: No  Have you ever been treated for depression?: No  Are you having problems with crying spells or loss of self-esteem?: No  Have you ever required psychiatric care?: No  Have you ever had hepatitis, liver disease or jaundice?: No  Have you been treated for blood clots in your veins, deep vein thromosis,  inflammation in the veins, thrombosis, phlebitis, pulmonary embolism or varicosities?: No  Have you had excessive bleeding after surgery or dental work?: No  Do you bleed more than other women after a cut or scratch?: No  Do you have a history of anemia?: No  Have you ever had thyroid problems or taken thyroid medication?: No   Do you have any endocrine problems?: No  Have you ever been in a major accident or suffered serious trauma?: No  Within the last year, has anyone hit, slapped, kicked or otherwise hurt you?: No  In the last year, has anyone forced you to have sex when you didn't want to?: No    Past Medical History 2   Have you ever received a blood transfusion?: No  Would you refuse a blood transfusion if a doctor judged it to be medically necessary?: No   If you answered Yes, would you rather die than receive a blood transfusion?: No  If you answered Yes, is this for Presybeterian reasons?: No  Does anyone in your home smoke?: No  Do you use tobacco products?: No  Do you drink beer, wine or hard liquor?: No  Do you use any of the following: marijuana, speed, cocaine, heroin, hallucinogens or other drugs?: No   Is your blood type Rh negative?: No  Have you ever had abnormal antibodies in your blood?: No  Have you ever had asthma?: No  Have you ever had tuberculosis?: No  Do you have any allergies to drugs or over-the-counter medications?: No  Allergies: Dust Mites, Aspartame, Ethanol, Venlafaxine, Hydrochloride, Sertraline: No  Have you had any breast problems?: No  Have you ever ?: No  Have you had any gynecological surgical procedures such as cervical conization, a LEEP procedure, laser treatment, cryosurgery of the cervix or a dilation and curettage, etc?: No  Have you ever had any other surgical procedures?: (!) Yes  Have you been hospitalized for a nonsurgical reason excluding normal delivery?: No  Have you ever had any anesthetic complications?: No  Have you ever had an abnormal pap smear?:  No    Past Medical History (Continued)  Do you have a history of abnormalities of the uterus?: No  Did your mother take KANA or any other hormones when she was pregnant with you?: No  Did it take you more than a year to become pregnant?: No  Have you ever been evaluated or treated for infertility?: No  Is there a history of medical problems in your family, which you feel may be important to this pregnancy?: No  Do you have any other problems we have not asked about which you feel may be important to this pregnancy?: No    Symptoms since last menstrual period  Do you have any of the following symptoms: abdominal pain, blood in stools or urine, chest pain, shortness of breath, coughing or vomiting up blood, your heart racing or skipping beats, nausea and vomiting, pain on urination or vaginal discharge or bleed: (!) Yes  Will the patient be 35 years old or older at the time of delivery?: (!) Yes    Has the patient, baby's father or anyone in either family had:  Thalassemia (Italian, Greek, Mediterranean or  background only) and an MCV result less than 80?: No  Neural tube defect such as meningomyelocele, spina bifida or anencephaly?: No  Congenital heart defect?: No  Down's Syndrome?: No  Thomas-Sachs disease (Sabianism, Cajun, Irish-[place])?: No  Sickle cell disease or trait ()?: No  Hemophilia or other inherited problems of blood?: No  Muscular dystrophy?: No  Cystic fibrosis?: No  [place]'s chorea?: No  Mental retardation/autism?: No  If yes, was the person tested for fragile X?: No  Any other inherited genetic or chromosomal disorder?: No  Maternal metabolic disorder (e.g Insulin-dependent diabetes, PKU)?: No  A child with birth defects not listed above?: No  Recurrent pregnancy loss or stillbirth?: No   Has the patient had any medications/street drugs/alcohol since her last menstrual period?: No  Does the patient or baby's father have any other genetic risks?: No    Infection History   Do you object to  being tested for Hepatitis B?: No  Do you object to being tested for HIV?: No   Do you feel that you are at high risk for coming in contact with the AIDS virus?: No  Have you ever been treated for tuberculosis?: No  Have you ever had a positive skin test for tuberculosis?: No  Do you live with someone who has tuberculosis?: No  Have you ever been exposed to tuberculosis?: No  Do you have genital herpes?: No  Does your partner have genital herpes?: No  Have you had a viral illness since your last period?: No  Have you ever had gonorrhea, chlamydia, syphilis, venereal warts, trichomoniasis, pelvic inflammatory disease or any other sexually transmitted disease?: No  Do you know if you are a genital group B streptococcus carrier?: No  Have you had chicken pox/varicella?: (!) Yes   Have you been vaccinated against chicken Pox?: No  Have you had any other infectious diseases?: No      Allergies as of 7/14/2020:    Allergies as of 07/14/2020     (No Known Allergies)       Current medications are:  No current outpatient medications on file.         Early ultrasound screening tool:    Does patient have irregular periods?  No  Did patient use hormonal birth control in the three months prior to positive urine pregnancy test? No  Is the patient breastfeeding?  No  Is the patient 10 weeks or greater at time of education visit?  No

## 2020-07-15 ENCOUNTER — TRANSCRIBE ORDERS (OUTPATIENT)
Dept: MATERNAL FETAL MEDICINE | Facility: CLINIC | Age: 35
End: 2020-07-15

## 2020-07-15 DIAGNOSIS — O26.90 PREGNANCY RELATED CONDITION, ANTEPARTUM: Primary | ICD-10-CM

## 2020-07-24 ENCOUNTER — TELEPHONE (OUTPATIENT)
Dept: OBGYN | Facility: CLINIC | Age: 35
End: 2020-07-24

## 2020-07-24 DIAGNOSIS — R39.9 UTI SYMPTOMS: Primary | ICD-10-CM

## 2020-07-24 NOTE — TELEPHONE ENCOUNTER
Given that she is pregnant, I would recommend UA and urine culture collection, but would then be ok with starting antibiotics while awaiting result.    Cheryl Mai MD

## 2020-07-24 NOTE — TELEPHONE ENCOUNTER
TC to patient. Pt agrees to leave urine sample. She will go to the [facility] now since she is in [place]. Orders signed. Pharmacy is teed up.  Paz Jessica, RN-BSN

## 2020-07-24 NOTE — TELEPHONE ENCOUNTER
Patient is 7w5d, c/o possible UTI. Pt having burning when going to the bathroom and going frequently. Just started about an hour ago. Pt states that she does not want to come in for an appointment and just wants to be treated. Explained to patient that usually we have patient's come in for an appt or at least a lab only to leave a urine.  Pt asked if message be sent to provider.   Paz Jessica RN-BSN

## 2020-07-27 DIAGNOSIS — R39.9 UTI SYMPTOMS: ICD-10-CM

## 2020-07-27 LAB
ALBUMIN UR-MCNC: NEGATIVE MG/DL
APPEARANCE UR: CLEAR
BILIRUB UR QL STRIP: NEGATIVE
COLOR UR AUTO: YELLOW
GLUCOSE UR STRIP-MCNC: NEGATIVE MG/DL
HGB UR QL STRIP: ABNORMAL
KETONES UR STRIP-MCNC: NEGATIVE MG/DL
LEUKOCYTE ESTERASE UR QL STRIP: ABNORMAL
NITRATE UR QL: NEGATIVE
NON-SQ EPI CELLS #/AREA URNS LPF: ABNORMAL /LPF
PH UR STRIP: 6 PH (ref 5–7)
RBC #/AREA URNS AUTO: ABNORMAL /HPF
SOURCE: ABNORMAL
SP GR UR STRIP: 1.03 (ref 1–1.03)
UROBILINOGEN UR STRIP-ACNC: 0.2 EU/DL (ref 0.2–1)
WBC #/AREA URNS AUTO: ABNORMAL /HPF

## 2020-07-27 PROCEDURE — 81001 URINALYSIS AUTO W/SCOPE: CPT | Performed by: OBSTETRICS & GYNECOLOGY

## 2020-07-27 PROCEDURE — 87088 URINE BACTERIA CULTURE: CPT | Performed by: OBSTETRICS & GYNECOLOGY

## 2020-07-27 PROCEDURE — 87186 SC STD MICRODIL/AGAR DIL: CPT | Performed by: OBSTETRICS & GYNECOLOGY

## 2020-07-27 PROCEDURE — 87086 URINE CULTURE/COLONY COUNT: CPT | Performed by: OBSTETRICS & GYNECOLOGY

## 2020-07-27 RX ORDER — CEPHALEXIN 500 MG/1
500 CAPSULE ORAL 2 TIMES DAILY
Qty: 10 CAPSULE | Refills: 0 | Status: SHIPPED | OUTPATIENT
Start: 2020-07-27 | End: 2020-09-18

## 2020-07-28 ENCOUNTER — ANCILLARY PROCEDURE (OUTPATIENT)
Dept: ULTRASOUND IMAGING | Facility: CLINIC | Age: 35
End: 2020-07-28
Attending: OBSTETRICS & GYNECOLOGY
Payer: COMMERCIAL

## 2020-07-28 ENCOUNTER — OFFICE VISIT (OUTPATIENT)
Dept: OBGYN | Facility: CLINIC | Age: 35
End: 2020-07-28
Payer: COMMERCIAL

## 2020-07-28 VITALS
WEIGHT: 134 LBS | HEART RATE: 69 BPM | DIASTOLIC BLOOD PRESSURE: 83 MMHG | BODY MASS INDEX: 23.74 KG/M2 | TEMPERATURE: 97.9 F | SYSTOLIC BLOOD PRESSURE: 128 MMHG

## 2020-07-28 DIAGNOSIS — O09.529 AMA (ADVANCED MATERNAL AGE) MULTIGRAVIDA 35+: ICD-10-CM

## 2020-07-28 DIAGNOSIS — O21.9 NAUSEA AND VOMITING IN PREGNANCY: ICD-10-CM

## 2020-07-28 DIAGNOSIS — Z34.01 PREGNANCY, FIRST, FIRST TRIMESTER: Primary | ICD-10-CM

## 2020-07-28 LAB
ABO + RH BLD: NORMAL
ABO + RH BLD: NORMAL
ALBUMIN UR-MCNC: NEGATIVE MG/DL
APPEARANCE UR: CLEAR
BILIRUB UR QL STRIP: NEGATIVE
BLD GP AB SCN SERPL QL: NORMAL
BLOOD BANK CMNT PATIENT-IMP: NORMAL
COLOR UR AUTO: YELLOW
ERYTHROCYTE [DISTWIDTH] IN BLOOD BY AUTOMATED COUNT: 13.1 % (ref 10–15)
GLUCOSE UR STRIP-MCNC: NEGATIVE MG/DL
HBA1C MFR BLD: 5.3 % (ref 0–5.6)
HCT VFR BLD AUTO: 39.9 % (ref 35–47)
HGB BLD-MCNC: 13.7 G/DL (ref 11.7–15.7)
HGB UR QL STRIP: ABNORMAL
KETONES UR STRIP-MCNC: NEGATIVE MG/DL
LEUKOCYTE ESTERASE UR QL STRIP: NEGATIVE
MCH RBC QN AUTO: 30.9 PG (ref 26.5–33)
MCHC RBC AUTO-ENTMCNC: 34.3 G/DL (ref 31.5–36.5)
MCV RBC AUTO: 90 FL (ref 78–100)
NITRATE UR QL: NEGATIVE
PH UR STRIP: 6 PH (ref 5–7)
PLATELET # BLD AUTO: 269 10E9/L (ref 150–450)
RBC # BLD AUTO: 4.43 10E12/L (ref 3.8–5.2)
SOURCE: ABNORMAL
SP GR UR STRIP: >1.03 (ref 1–1.03)
SPECIMEN EXP DATE BLD: NORMAL
UROBILINOGEN UR STRIP-ACNC: 0.2 EU/DL (ref 0.2–1)
WBC # BLD AUTO: 8.9 10E9/L (ref 4–11)

## 2020-07-28 PROCEDURE — 87340 HEPATITIS B SURFACE AG IA: CPT | Performed by: OBSTETRICS & GYNECOLOGY

## 2020-07-28 PROCEDURE — 81003 URINALYSIS AUTO W/O SCOPE: CPT | Performed by: OBSTETRICS & GYNECOLOGY

## 2020-07-28 PROCEDURE — 87086 URINE CULTURE/COLONY COUNT: CPT | Performed by: OBSTETRICS & GYNECOLOGY

## 2020-07-28 PROCEDURE — 85027 COMPLETE CBC AUTOMATED: CPT | Performed by: OBSTETRICS & GYNECOLOGY

## 2020-07-28 PROCEDURE — 99213 OFFICE O/P EST LOW 20 MIN: CPT | Performed by: OBSTETRICS & GYNECOLOGY

## 2020-07-28 PROCEDURE — 86780 TREPONEMA PALLIDUM: CPT | Performed by: OBSTETRICS & GYNECOLOGY

## 2020-07-28 PROCEDURE — 87389 HIV-1 AG W/HIV-1&-2 AB AG IA: CPT | Performed by: OBSTETRICS & GYNECOLOGY

## 2020-07-28 PROCEDURE — 86901 BLOOD TYPING SEROLOGIC RH(D): CPT | Performed by: OBSTETRICS & GYNECOLOGY

## 2020-07-28 PROCEDURE — 76817 TRANSVAGINAL US OBSTETRIC: CPT | Performed by: OBSTETRICS & GYNECOLOGY

## 2020-07-28 PROCEDURE — 36415 COLL VENOUS BLD VENIPUNCTURE: CPT | Performed by: OBSTETRICS & GYNECOLOGY

## 2020-07-28 PROCEDURE — 83036 HEMOGLOBIN GLYCOSYLATED A1C: CPT | Performed by: OBSTETRICS & GYNECOLOGY

## 2020-07-28 PROCEDURE — 86900 BLOOD TYPING SEROLOGIC ABO: CPT | Performed by: OBSTETRICS & GYNECOLOGY

## 2020-07-28 PROCEDURE — 86850 RBC ANTIBODY SCREEN: CPT | Performed by: OBSTETRICS & GYNECOLOGY

## 2020-07-28 PROCEDURE — 86762 RUBELLA ANTIBODY: CPT | Performed by: OBSTETRICS & GYNECOLOGY

## 2020-07-28 RX ORDER — PYRIDOXINE HCL (VITAMIN B6) 25 MG
TABLET ORAL
Qty: 60 TABLET | Refills: 2 | Status: SHIPPED | OUTPATIENT
Start: 2020-07-28 | End: 2020-12-28

## 2020-07-28 NOTE — PROGRESS NOTES
[facility]    Patient presents to visit with partner.    CC: ultrasound follow up    S:Ella Palacios is a 35 year old  at 8w2d who presents today for ultrasound follow up.  Patient reports having some nausea and vomiting.  She has emesis about two times per day.  Not taking any medication for this.  She denies any vaginal bleeding.  She has had some low pelvic cramping, and is currently being treated for presumptive UTI while awaiting urine culture results.      Pregnancy notable for:  -history of GDM  -history of cornual ectopic  -history of  x2  -AMA    O:   Patient Vitals for the past 24 hrs:   BP Temp Temp src Pulse Weight   20 1305 128/83 97.9  F (36.6  C) Oral 69 60.8 kg (134 lb)   ]  Exam:  General- awake, alert, answering questions appropriately, appears comfortable  CV- regular rate  Lung- breathing comfortably on room air    Imaging and Labs:  Prelimiary US read- viable IUP with FHR of 164, c/w LMP    A&P: Ella Palacios is a 35 year old  at 8w2d who presents today for ultrasound follow up.  (Z34.01) Pregnancy, first, first trimester  (primary encounter diagnosis)  Comment: Reviewed viable IUP on today's ultrasound.  Reviewed 1st OB labs, will have drawn today.  Reviewed urine culture is not yet resulted, but will not have to re-collect urine today.  Patient plans for genetic testing.  NT scheduled for 2020.    Plan: first OB visit scheduled for 8/10/2020    (O21.9) Nausea and vomiting in pregnancy  Comment: Patient with N/V.  Reviewed medication management.   Plan: pyridOXINE (VITAMIN  B-6) 25 MG tablet,         doxylamine (UNISOM) 25 MG TABS tablet          Afshan Boyce MD        
no chills/no vomiting/no fever

## 2020-07-28 NOTE — NURSING NOTE
"Chief Complaint   Patient presents with     Follow Up     ultrasound       Initial /83 (BP Location: Left arm, Patient Position: Sitting, Cuff Size: Adult Regular)   Pulse 69   Temp 97.9  F (36.6  C) (Oral)   Wt 60.8 kg (134 lb)   LMP 2020   BMI 23.74 kg/m   Estimated body mass index is 23.74 kg/m  as calculated from the following:    Height as of 20: 1.6 m (5' 3\").    Weight as of this encounter: 60.8 kg (134 lb).  BP completed using cuff size: regular    Questioned patient about current smoking habits.  Pt. has never smoked.          The following HM Due: NONE      The following patient reported/Care Every where data was sent to:  P ABSTRACT QUALITY INITIATIVES [[id number]]  SPIKE Mixon MA            "

## 2020-07-28 NOTE — PATIENT INSTRUCTIONS
Patient Education     Comfort Tips During Pregnancy    Talk with your healthcare provider before using pain-relieving medicine at any time during your pregnancy.  First trimester tips  Nausea    Get up slowly. Eat a few unsalted crackers before you get out of bed.    Avoid smells that bother you.    Eat small bland low fat, light high-protein meals at frequent intervals.    Sip on water, weak tea, or clear soft drinks, like ginger ale. Eat ice chips.  Fatigue    Take catnaps when you can.    Get regular exercise.    Accept help from others.    Practice good sleep habits, like going to bed and getting up at the same time each day. Use your bed only for sleep and sex.  Mood swings    Talk about your feelings with others, including other mothers.    Limit sugar, chocolate, and caffeine.    Eat a healthy diet. Don t skip meals.    Get regular exercise.  Headaches    Get fresh air and exercise.    Relax and get enough rest.    Check with your healthcare provider before taking any pain medicines.  Second trimester tips  Here are some suggestions to help you cope:    To limit ankle swelling, sit with your feet raised or wear support hose.    If you have pain in your groin and stomach (round ligament pain), avoid sudden twisting movements.    For leg cramps, flexing your foot often brings immediate relief. You also may try massaging your calf in long, downward strokes, or stretching your legs before going to bed. Get enough exercise and wear shoes with flexible soles.  Third trimester tips  Reducing heartburn    Eat small, light meals throughout the day rather than 3 large ones.    Sleep with your upper body raised 6 inches. Don t lie down until 2 hours after you eat.    Don't eat greasy, fried, or spicy foods.    Avoid citrus fruits and juices.  Treating constipation    Eat foods high in fiber (whole-grain foods, fresh fruit and vegetables).    Drink plenty of water.    Get regular exercise.    Discuss other medicines  (like docusate and psyllium) with your healthcare provider.  Taking care of your breasts    Avoid using harsh soaps or alcohol, which can cause excessive dryness.    Wear nursing bras. They provide more support than regular bras and can be used after pregnancy if you breastfeed.  Getting a good night s sleep    Take a warm shower before bed.    Sleep on a firm mattress.    Lie on your side with 1 leg crossed over the other.    Use pillows to support arms, legs, and belly.  Date Last Reviewed: 10/1/2017    3068-7043 [employer]. [address] [place] [address]. All rights reserved. This information is not intended as a substitute for professional medical care. Always follow your healthcare professional's instructions.           Patient Education     Adapting to Pregnancy: First Trimester    As your body adjusts, you may have to change or limit your daily activities. You ll need more rest. You may also need to use the energy you have more wisely.  Your changing body  Almost every part of your body is affected as you adapt to pregnancy. The uterus and cervix will begin to soften right away. You may not look very pregnant during the first 3 months. But you are likely to have some common signs of early pregnancy:    Nausea    Fatigue    Frequent urination    Mood swings    Bloating of the belly    Constipation    Heartburn    Missed or light periods (first trimester bleeding)    Nipple or breast tenderness and breast swelling  It s not too late to start good habits  What matters most is protecting your baby from this moment on. If you smoke, drink alcohol, or use drugs, now is the time to stop. If you need help, talk with your healthcare provider:    Smoking increases the risk of stillbirth or having a low-birth-weight baby. If you smoke, quit now.    Alcohol and drugs have been linked with miscarriage, birth defects, intellectual disability, and low birth weight. Do not drink alcohol or take  drugs.  Tips to relieve nausea  Although nausea can happen at any time of the day, it may be worse in the morning. To help prevent nausea:    Eat small, light meals at frequent intervals.    Drink fluids often.    Get up slowly. Eat a few unsalted crackers before you get out of bed.    Avoid smells that bother you.    Avoid spicy and fatty foods.    Eat an ice pop in your favorite flavor.    Get plenty of rest.    Ask your healthcare provider about taking carol or vitamin B6 for nausea and vomiting.    Talk with your healthcare provider if you take vitamins that upset your stomach.  Work concerns  The end of the first trimester is a good time to discuss working during pregnancy with your employer. Follow your healthcare provider s advice if your job needs you to stand for a long time, work with hazardous tools, or even sit at a desk all day. Your workspace, workload, or scheduled hours may need to be adjusted. Perhaps you can change body postures more often or take an extra break.  Advice for travel  Talk to your healthcare provider first, but the second trimester may be the best time for any travel. You may be advised to avoid certain trips while you re pregnant. Food and water can be concerns in developing countries. Travel by car is a good choice, as you can stop, get out, and stretch. Bring snacks and water along. Fasten the lap belt below your belly, low over your hips. Also be sure to wear the shoulder harness.  Intimacy  Unless your healthcare provider tells you to, there is no reason to stop having sex while you re pregnant. You or your partner may notice changes in desire. Desire may be less in the first trimester, due to nausea and fatigue. In the second trimester, sex may be very enjoyable. The third trimester can be a challenge comfort-wise. Try different positions and see what s best for you both.  Date Last Reviewed: 10/1/2017    9282-9355 [employer]. [address] [facility], [place]  "[address]. All rights reserved. This information is not intended as a substitute for professional medical care. Always follow your healthcare professional's instructions.           Patient Education     Pregnancy: Your First Trimester Changes  The first trimester is a time of rapid development for your baby. Because your baby is growing so quickly, it is important that you start a healthy lifestyle right away. By the end of the first trimester, your baby has formed all of its major body organs and weighs just over an ounce.     Actual size of baby is 1/4\"    Month 1 (weeks 1 to 4)  The placenta (the organ that nourishes your baby) begins to form. The brain, spinal cord, heart, gastrointestinal tract, and lungs begin to develop. Your baby is about 1/4-inch long by the end of the first month.     Actual size of baby is 1\"      Month 2 (weeks 5 to 8)  All of your baby s major body organs form. The face, fingers, toes, ears, and eyes appear. By the end of the month, your baby is about 1-inch long.     Actual size of baby is 3\"      Month 3 (weeks 9 to 12)  Your baby can open and close its fists and mouth. The sexual organs begin to form. As the first trimester ends, your baby is about 3-inches long.  Date Last Reviewed: 10/1/2017    1559-1221 [employer]. [address] [place] [address], [place] [address]. All rights reserved. This information is not intended as a substitute for professional medical care. Always follow your healthcare professional's instructions.           "

## 2020-07-29 LAB
BACTERIA SPEC CULT: ABNORMAL
BACTERIA SPEC CULT: NO GROWTH
HBV SURFACE AG SERPL QL IA: NONREACTIVE
HIV 1+2 AB+HIV1 P24 AG SERPL QL IA: NONREACTIVE
Lab: ABNORMAL
RUBV IGG SERPL IA-ACNC: 8 IU/ML
SPECIMEN SOURCE: ABNORMAL
SPECIMEN SOURCE: NORMAL
T PALLIDUM AB SER QL: NONREACTIVE

## 2020-08-10 ENCOUNTER — PRENATAL OFFICE VISIT (OUTPATIENT)
Dept: OBGYN | Facility: CLINIC | Age: 35
End: 2020-08-10
Payer: COMMERCIAL

## 2020-08-10 ENCOUNTER — TELEPHONE (OUTPATIENT)
Dept: OBGYN | Facility: CLINIC | Age: 35
End: 2020-08-10

## 2020-08-10 VITALS
DIASTOLIC BLOOD PRESSURE: 80 MMHG | WEIGHT: 131 LBS | OXYGEN SATURATION: 100 % | HEART RATE: 94 BPM | BODY MASS INDEX: 23.21 KG/M2 | SYSTOLIC BLOOD PRESSURE: 113 MMHG

## 2020-08-10 DIAGNOSIS — Z34.80 SUPERVISION OF OTHER NORMAL PREGNANCY, ANTEPARTUM: Primary | ICD-10-CM

## 2020-08-10 LAB — GLUCOSE 1H P 50 G GLC PO SERPL-MCNC: 191 MG/DL (ref 60–129)

## 2020-08-10 PROCEDURE — 87491 CHLMYD TRACH DNA AMP PROBE: CPT | Performed by: OBSTETRICS & GYNECOLOGY

## 2020-08-10 PROCEDURE — 82950 GLUCOSE TEST: CPT | Performed by: OBSTETRICS & GYNECOLOGY

## 2020-08-10 PROCEDURE — 87591 N.GONORRHOEAE DNA AMP PROB: CPT | Performed by: OBSTETRICS & GYNECOLOGY

## 2020-08-10 PROCEDURE — 99207 ZZC FIRST OB VISIT: CPT | Performed by: OBSTETRICS & GYNECOLOGY

## 2020-08-10 PROCEDURE — 36415 COLL VENOUS BLD VENIPUNCTURE: CPT | Performed by: OBSTETRICS & GYNECOLOGY

## 2020-08-10 NOTE — PROGRESS NOTES
Labs reviewed today and rubella equivocal.  Feeling extremely fatigued.  This pregnancy was unplanned and did miss a few control pills.  Doing early 1 hour GCT since has had gestational diabetes in the past.  Dates by LMP consistent with 8-week ultrasound.  Discussed AMA and testing options.  Has first trimester screen scheduled prior cornual wedge resection and 2 C-sections, plan delivery at 37 to 38 weeks. Providers/residents, weight gain/exercise, delivery discussed. RTC 4 weeks. BE    HPI:  Ella Palacios is a 35 year old female Patient's last menstrual period was 2020. at 10w1d, Estimated Date of Delivery: Mar 7, 2021.  She denies vaginal bleeding and abdominal pain.    No other c/o.    Past Medical History:   Diagnosis Date     Diabetes mellitus of mother, complicating pregnancy, childbirth, or the puerperium, unspecified as to episode of care(648.00) 2015    Gestational diabetes--glyburide       Past Surgical History:   Procedure Laterality Date     C BREAST AUGMENTATION  2017    silicone      SECTION  2012    Procedure:  SECTION;   section ;  Surgeon: Isabel Chaparro MD;  Location: [facility]      SECTION N/A 2015    Procedure:  SECTION;  Surgeon: Isabel Chaparro MD;  Location: [facility]     LAPAROSCOPY DIAGNOSTIC (GYN)  2011    Procedure:LAPAROSCOPY DIAGNOSTIC (GYN); Surgeon:ISABEL CHAPARRO; Location:[facility] OR     LAPAROTOMY EXPLORATORY  2011    cornual ectopic with wedge resection       Allergies: Patient has no known allergies.     EXAM:  Blood pressure 113/80, pulse 94, weight 59.4 kg (131 lb), last menstrual period 2020, SpO2 100 %, not currently breastfeeding.   BMI= Body mass index is 23.21 kg/m .  General - pleasant female in no acute distress.  Neck - supple without lymphadenopathy or thyromegaly.  Lungs - clear to auscultation bilaterally.  Heart - regular rate and rhythm without murmur.  Breast -  deferred--done at February annual  Abdomen - soft, nontender, nondistended,  gravid, consistant with dates.  Rectovaginal - deferred.  Musculoskeletal - no gross deformities.  Neurological - normal strength, sensation, and mental status.    Doptones were 166    ASSESSMENT/PLAN:    (Z34.80) Supervision of other normal pregnancy, antepartum  Comment: AMA  Plan: NEISSERIA GONORRHOEA PCR, CHLAMYDIA TRACHOMATIS        PCR, POC US OB        Discussed testing options for AMA and will do first trimester screen and level 2.  AFP discussed for next visit.  Spouse has been visiting strip club so check gonorrhea and chlamydia today.  Discussed what changes have occurred since her last delivery 5 years ago as this was a surprise pregnancy.    Weight gain and exercise during pregnancy was discussed at today's visit.  The patient will return to clinic in 4 weeks for continued prenatal care.

## 2020-08-13 ENCOUNTER — VIRTUAL VISIT (OUTPATIENT)
Dept: FAMILY MEDICINE | Facility: OTHER | Age: 35
End: 2020-08-13
Payer: COMMERCIAL

## 2020-08-13 DIAGNOSIS — Z20.822 SUSPECTED 2019 NOVEL CORONAVIRUS INFECTION: Primary | ICD-10-CM

## 2020-08-13 PROCEDURE — 99421 OL DIG E/M SVC 5-10 MIN: CPT | Performed by: PHYSICIAN ASSISTANT

## 2020-08-13 NOTE — PROGRESS NOTES
"Date: 2020 12:30:36  Clinician: Rigoberto Lebron  Clinician NPI: [id number]  Patient: Ella Palacios  Patient : 1985  Patient Address: [address] [place] [address]  Samir Atkins, [place] [address]  Patient Phone: [phone]  Visit Protocol: URI  Patient Summary:  Ella is a 35 year old ( : 1985 ) female who initiated a Visit for COVID-19 (Coronavirus) evaluation and screening. When asked the question \"Please sign me up to receive news, health information and promotions from [employer].\", Ella responded \"Yes\".    Ella states her symptoms started 1-2 days ago.   Her symptoms consist of a headache, chills, rhinitis, myalgia, and malaise. Ella also feels feverish.   Symptom details     Nasal secretions: The color of her mucus is clear.    Temperature: Her current temperature is 97.1 degrees Fahrenheit.     Headache: She states the headache is mild (1-3 on a 10 point pain scale).      Ella denies having ear pain, enlarged lymph nodes, nausea, sore throat, teeth pain, ageusia, diarrhea, cough, nasal congestion, vomiting, anosmia, facial pain or pressure, and wheezing. She also denies having a sinus infection within the past year and having recent facial or sinus surgery in the past 60 days. She is not experiencing dyspnea.   Precipitating events  She has not recently been exposed to someone with influenza. Ella has been in close contact with the following high risk individuals: pregnant women and children under the age of 5.   Pertinent COVID-19 (Coronavirus) information  In the past 14 days, Ella has not worked in a congregate living setting.   She does not work or volunteer as healthcare worker or a  and does not work or volunteer in a healthcare facility.   Ella also has not lived in a congregate living setting in the past 14 days. She does not live with a healthcare worker.   Ella has not had a close contact with a laboratory-confirmed COVID-19 patient within 14 days " of symptom onset.   Since December 2019, Ella and has not had upper respiratory infection or influenza-like illness. Has not been diagnosed with lab-confirmed COVID-19 test   Pertinent medical history  Ella has taken an antibiotic medication in the past month. Antibiotic details as reported by the patient (free text): Uti   Ella does not get yeast infections when she takes antibiotics.   Ella does not need a return to work/school note.   Weight: 131 lbs   Ella does not smoke or use smokeless tobacco.   She is pregnant and denies breastfeeding.   Weight: 131 lbs    MEDICATIONS: pyridoxine (vitamin B6) oral, ALLERGIES: NKDA  Clinician Response:  Dear Ella,   Your symptoms show that you may have coronavirus (COVID-19). This illness can cause fever, cough and trouble breathing. Many people get a mild case and get better on their own. Some people can get very sick.  What should I do?  We would like to test you for this virus.   1. Please call [phone] to schedule your visit. Explain that you were referred by [facility] to have a COVID-19 test. Be ready to share your [facility] visit ID number.  The following will serve as your written order for this COVID Test, ordered by me, for the indication of suspected COVID [Z20.828]: The test will be ordered in [facility], our electronic health record, after you are scheduled. It will show as ordered and authorized by Dwayne Burch MD.  Order: COVID-19 (Coronavirus) PCR for SYMPTOMATIC testing from [facility].      2. When it's time for your COVID test:  Stay at least 6 feet away from others. (If someone will drive you to your test, stay in the backseat, as far away from the  as you can.)   Cover your mouth and nose with a mask, tissue or washcloth.  Go straight to the testing site. Don't make any stops on the way there or back.      3.Starting now: Stay home and away from others (self-isolate) until:   You've had no fever---and no medicine that reduces fever---for  "one full day (24 hours). And...   Your other symptoms have gotten better. For example, your cough or breathing has improved. And...   At least 10 days have passed since your symptoms started.       During this time, don't leave the house except for testing or medical care.   Stay in your own room, even for meals. Use your own bathroom if you can.   Stay away from others in your home. No hugging, kissing or shaking hands. No visitors.  Don't go to work, school or anywhere else.    Clean \"high touch\" surfaces often (doorknobs, counters, handles, etc.). Use a household cleaning spray or wipes. You'll find a full list of  on the EPA website: www.epa.gov/pesticide-registration/list-n-disinfectants-use-against-sars-cov-2.   Cover your mouth and nose with a mask, tissue or washcloth to avoid spreading germs.  Wash your hands and face often. Use soap and water.  Caregivers in these groups are at risk for severe illness due to COVID-19:  o People 65 years and older  o People who live in a nursing home or long-term care facility  o People with chronic disease (lung, heart, cancer, diabetes, kidney, liver, immunologic)  o People who have a weakened immune system, including those who:   Are in cancer treatment  Take medicine that weakens the immune system, such as corticosteroids  Had a bone marrow or organ transplant  Have an immune deficiency  Have poorly controlled HIV or AIDS  Are obese (body mass index of 40 or higher)  Smoke regularly   o Caregivers should wear gloves while washing dishes, handling laundry and cleaning bedrooms and bathrooms.  o Use caution when washing and drying laundry: Don't shake dirty laundry, and use the warmest water setting that you can.  o For more tips, go to www.cdc.gov/coronavirus/2019-ncov/downloads/10Things.pdf.    4.Sign up for [employer]. We know it's scary to hear that you might have COVID-19. We want to track your symptoms to make sure you're okay over the next 2 weeks. Please " look for an email from [employer]---this is a free, online program that we'll use to keep in touch. To sign up, follow the link in the email. Learn more at [email or web address]  How can I take care of myself?   Get lots of rest. Drink extra fluids (unless a doctor has told you not to).   Take Tylenol (acetaminophen) for fever or pain. If you have liver or kidney problems, ask your family doctor if it's okay to take Tylenol.   Adults can take either:    650 mg (two 325 mg pills) every 4 to 6 hours, or...   1,000 mg (two 500 mg pills) every 8 hours as needed.    Note: Don't take more than 3,000 mg in one day. Acetaminophen is found in many medicines (both prescribed and over-the-counter medicines). Read all labels to be sure you don't take too much.   For children, check the Tylenol bottle for the right dose. The dose is based on the child's age or weight.    If you have other health problems (like cancer, heart failure, an organ transplant or severe kidney disease): Call your specialty clinic if you don't feel better in the next 2 days.       Know when to call 911. Emergency warning signs include:    Trouble breathing or shortness of breath Pain or pressure in the chest that doesn't go away Feeling confused like you haven't felt before, or not being able to wake up Bluish-colored lips or face.  Where can I get more information?   [facility] -- About COVID-19: [email or web address]   CDC -- What to Do If You're Sick: www.cdc.gov/coronavirus/2019-ncov/about/steps-when-sick.html   CDC -- Ending Home Isolation: www.cdc.gov/coronavirus/2019-ncov/hcp/disposition-in-home-patients.html   CDC -- Caring for Someone: www.cdc.gov/coronavirus/2019-ncov/if-you-are-sick/care-for-someone.html   [facility] -- Interim Guidance for Hospital Discharge to Home: www.health.[facility].[place]/diseases/coronavirus/hcp/hospdischarge.pdf   [facility] clinical trials (COVID-19 research studies):  clinicalaffairs.[facility]-clinical-trials    Below are the COVID-19 hotlines at the [place] Department of Health ([facility]). Interpreters are available.    For health questions: Call 177-305-8784 or 1-569.555.5724 (7 a.m. to 7 p.m.) For questions about schools and childcare: Call 308-190-2826 or 1-357.788.2584 (7 a.m. to 7 p.m.)    Diagnosis: Acute upper respiratory infection, unspecified  Diagnosis ICD: J06.9  Additional Clinician Notes: If your symptoms are not improving or worsen, please go to one of our urgent care locations for evaluation.

## 2020-08-15 DIAGNOSIS — Z20.822 SUSPECTED 2019 NOVEL CORONAVIRUS INFECTION: ICD-10-CM

## 2020-08-15 PROCEDURE — U0003 INFECTIOUS AGENT DETECTION BY NUCLEIC ACID (DNA OR RNA); SEVERE ACUTE RESPIRATORY SYNDROME CORONAVIRUS 2 (SARS-COV-2) (CORONAVIRUS DISEASE [COVID-19]), AMPLIFIED PROBE TECHNIQUE, MAKING USE OF HIGH THROUGHPUT TECHNOLOGIES AS DESCRIBED BY CMS-2020-01-R: HCPCS | Performed by: FAMILY MEDICINE

## 2020-08-17 LAB
SARS-COV-2 RNA SPEC QL NAA+PROBE: ABNORMAL
SPECIMEN SOURCE: ABNORMAL

## 2020-09-01 ENCOUNTER — PRE VISIT (OUTPATIENT)
Dept: MATERNAL FETAL MEDICINE | Facility: CLINIC | Age: 35
End: 2020-09-01

## 2020-09-02 ENCOUNTER — OFFICE VISIT (OUTPATIENT)
Dept: MATERNAL FETAL MEDICINE | Facility: CLINIC | Age: 35
End: 2020-09-02
Attending: OBSTETRICS & GYNECOLOGY
Payer: COMMERCIAL

## 2020-09-02 ENCOUNTER — HOSPITAL ENCOUNTER (OUTPATIENT)
Dept: ULTRASOUND IMAGING | Facility: CLINIC | Age: 35
End: 2020-09-02
Attending: OBSTETRICS & GYNECOLOGY
Payer: COMMERCIAL

## 2020-09-02 ENCOUNTER — ALLIED HEALTH/NURSE VISIT (OUTPATIENT)
Dept: INTERPRETER SERVICES | Facility: CLINIC | Age: 35
End: 2020-09-02
Payer: COMMERCIAL

## 2020-09-02 DIAGNOSIS — O26.90 PREGNANCY RELATED CONDITION, ANTEPARTUM: ICD-10-CM

## 2020-09-02 DIAGNOSIS — O09.521 MULTIGRAVIDA OF ADVANCED MATERNAL AGE IN FIRST TRIMESTER: Primary | ICD-10-CM

## 2020-09-02 DIAGNOSIS — O09.529 ANTEPARTUM MULTIGRAVIDA OF ADVANCED MATERNAL AGE: Primary | ICD-10-CM

## 2020-09-02 PROCEDURE — 40000072 ZZH STATISTIC GENETIC COUNSELING, < 16 MIN: Mod: ZF | Performed by: GENETIC COUNSELOR, MS

## 2020-09-02 PROCEDURE — 40000791 ZZHCL STATISTIC VERIFI PRENATAL TRISOMY 21,18,13: Performed by: OBSTETRICS & GYNECOLOGY

## 2020-09-02 PROCEDURE — 76813 OB US NUCHAL MEAS 1 GEST: CPT

## 2020-09-02 PROCEDURE — 36415 COLL VENOUS BLD VENIPUNCTURE: CPT | Performed by: OBSTETRICS & GYNECOLOGY

## 2020-09-02 PROCEDURE — T1013 SIGN LANG/ORAL INTERPRETER: HCPCS | Mod: U3,ZF

## 2020-09-02 NOTE — PROGRESS NOTES
"Please see \"Imaging\" tab under \"Chart Review\" for details of [facility].    Tex Lancaster MD  Maternal-Fetal Medicine      "

## 2020-09-02 NOTE — PROGRESS NOTES
[facility]  Genetic Counseling Consult    Patient: Ella Palacios YOB: 1985   Date of Service: 20      Ella Palacios was seen at [facility] for genetic consultation to discuss the options for screening and testing for fetal chromosome abnormalities.  The indication for genetic counseling is advanced maternal age. She was accompanied by her , Johny.        Impression/Plan:   1.  Ella had an ultrasound and blood draw for NIPT (Innatal test through [facility]).  Results are expected within 7-10 days, and will be available in [employer].  We will contact her to discuss the results, and a copy will be forwarded to the office of the referring OB provider. Ella provided verbal permission for detailed results to be disclosed on her voicemail, [phone], including fetal sex.     2.  Maternal serum AFP (single marker screen) is recommended after 15 weeks to screen for open neural tube defects. A quad screen should not be performed.    3.  An 18-20 week comprehensive ultrasound is standard of care for all women 35 or older at delivery.    Pregnancy History:   /Parity:    Age at Delivery: 36 year old  MARTIN: 3/7/2021, by Last Menstrual Period  Gestational Age: 13w3d    No significant complications or exposures were reported in the current pregnancy.    Ella white pregnancy history is significant for:  o , full-term, male   o , ectopic pregnancy requiring surgical intervention: removal of the right fallopian tube and cornual wedge resection.  o , full-term, male planned   o , full-term, male planned     Medical History:   Ella white reported medical history is not expected to impact pregnancy management or risks to fetal development. She did have confirmed a COVID-19 infection, but symptoms were mild (fever lasting less than 24 hours and ongoing lack of  taste/smell).         Family History:   A three-generation pedigree was obtained, and is scanned under the  Media  tab.   The following significant findings were reported by Ella:    Ella's youngest son had a speech delay and was not speaking full sentences until he was nearly [age]-years-old. He received therapy and now has no speech concerns.     Otherwise, the reported family history is negative for multiple miscarriages, stillbirths, birth defects, intellectual disability, known genetic conditions, and consanguinity.       Carrier Screening:   The patient reports that she and the father of the pregnancy have  ancestry:     Cystic fibrosis is an autosomal recessive genetic condition that occurs with increased frequency in individuals of  ancestry and carrier screening for this condition is available.  In addition,  screening in the [facility] includes cystic fibrosis.    We reviewed the clinical features, autosomal recessive inheritance, and options for carrier screening and  screening for hemoglobinopathies.      Expanded carrier screening for mutations in a large panel of genes associated with autosomal recessive conditions including cystic fibrosis, spinal muscular atrophy, and others, is now available.      The patient was not certain about whether to pursue carrier screening today. We discussed carrier screening in depth, using a  (for Ella's ). She and Johny were provided with a brochure in English and Lithuanian about their testing options, and contact information if they have questions or wish to pursue screening.       Risk Assessment for Chromosome Conditions:   We explained that the risk for fetal chromosome abnormalities increases with maternal age. We discussed specific features of common chromosome abnormalities, including Down syndrome, trisomy 13, trisomy 18, and sex chromosome trisomies.      - At age 36 at midtrimester, the  risk to have a baby with Down syndrome is 1 in 168.     - At age 36 at midtrimester, the risk to have a baby with any chromosome abnormality is 1 in 82.          Testing Options:   We discussed the following options:   Non-invasive Prenatal Testing (NIPT)    Maternal plasma cell-free DNA testing; first trimester ultrasound with nuchal translucency and nasal bone assessment is recommended, when appropriate    Screens for fetal trisomy 21, trisomy 13, trisomy 18, and sex chromosome aneuploidy    Cannot screen for open neural tube defects; maternal serum AFP after 15 weeks is recommended     Genetic Amniocentesis    Invasive procedure typically performed in the second trimester by which amniotic fluid is obtained for the purpose of chromosome analysis and/or other prenatal genetic analysis    Diagnostic results; >99% sensitivity for fetal chromosome abnormalities    AFAFP measurement tests for open neural tube defects     Comprehensive (Level II) ultrasound: Detailed ultrasound performed between 18-22 weeks gestation to screen for major birth defects and markers for aneuploidy.      We reviewed the benefits and limitations of this testing.  Screening tests provide a risk assessment specific to the pregnancy for certain fetal chromosome abnormalities, but cannot definitively diagnose or exclude a fetal chromosome abnormality.  Follow-up genetic counseling and consideration of diagnostic testing is recommended with any abnormal screening result.     Diagnostic tests carry inherent risks- including risk of miscarriage- that require careful consideration.  These tests can detect fetal chromosome abnormalities with greater than 99% certainty.  Results can be compromised by maternal cell contamination or mosaicism, and are limited by the resolution of cytogenetic G-banding technology.  There is no screening nor diagnostic test that can detect all forms of birth defects or mental disability.     It was a pleasure to be  involved with Ella s care. Face-to-face time of the meeting was 50 minutes.    May Heaton MS, [facility]  Certified Genetic Counselor  [facility]  Maternal Fetal Medicine  [email or web address]  [phone]    Patient seen, evaluated and discussed with the Genetic Counseling Intern. I have verified the content of the note, which accurately reflects my assessment of the patient and the plan of care.  Supervising Genetic Counselor  Chrissy Garcia MS, [facility]  Maternal Fetal Medicine  [facility]  Phone: [phone]  Email: [email or web address]

## 2020-09-10 ENCOUNTER — TELEPHONE (OUTPATIENT)
Dept: MATERNAL FETAL MEDICINE | Facility: CLINIC | Age: 35
End: 2020-09-10

## 2020-09-10 LAB — LAB SCANNED RESULT: NORMAL

## 2020-09-10 NOTE — TELEPHONE ENCOUNTER
September 10, 2020    I spoke with Ella regarding her NIPT results.     Results indicate NO ANEUPLOIDY DETECTED for chromosomes 21, 18, 13, or sex chromosomes (XX).     This puts her current pregnancy at low risk for Down syndrome, trisomy 18, trisomy 13 and sex chromosome abnormalities. This test is reported to have the following sensitivities: Down syndrome- >99.9%, trisomy 18- 97.4%, and trisomy 13- 87.5%. Although these results are reassuring, this does not replace a standard chromosome analysis from a chorionic villus sampling or amniocentesis.     Level II ultrasound is recommended, given AMA.     MSAFP is the appropriate second trimester screening test for open neural tube defects; the maternal quad screen is not recommended.    Her results are available in her Epic chart for her primary OB to review.     May Heaton MS, [facility]  Certified Genetic Counselor  [facility]  Maternal Fetal Medicine  [email or web address]  [phone]

## 2020-09-15 ENCOUNTER — TELEPHONE (OUTPATIENT)
Dept: MATERNAL FETAL MEDICINE | Facility: CLINIC | Age: 35
End: 2020-09-15

## 2020-09-15 NOTE — TELEPHONE ENCOUNTER
Received a message from the schedulers to call Ella. Ella had some follow-up questions about her NIPT results.    We discussed that the NIPT results were normal, with no aneuploidy detected for chromosomes 21, 18, 13, or the sex chromosomes. No Y chromosome material was identified, and predicted fetal sex is female (XX). I offered to send Ella a copy of the report, which she declined.     Ella stated that she was having a hard time believing the results were true because she was so excited to find out she was having a girl that she felt she hadn't really processed the rest of results. We briefly discussed again that amniocentesis for diagnostic testing always remains an options. We also discussed that Ella is scheduled for a comprehensive ultrasound on 10/12/20 and that she will find out more information about the pregnancy at that time. Ella stated that she is not interested in diagnostic testing right now and is looking forward to her ultrasound.     May Heaton MS, [facility]  Certified Genetic Counselor  [facility]  Maternal Fetal Medicine  [email or web address]  [phone]

## 2020-09-18 ENCOUNTER — PRENATAL OFFICE VISIT (OUTPATIENT)
Dept: OBGYN | Facility: CLINIC | Age: 35
End: 2020-09-18
Payer: COMMERCIAL

## 2020-09-18 VITALS
OXYGEN SATURATION: 100 % | DIASTOLIC BLOOD PRESSURE: 77 MMHG | SYSTOLIC BLOOD PRESSURE: 119 MMHG | TEMPERATURE: 97.5 F | BODY MASS INDEX: 23.58 KG/M2 | HEART RATE: 76 BPM | HEIGHT: 63 IN | WEIGHT: 133.1 LBS

## 2020-09-18 DIAGNOSIS — Z34.80 SUPERVISION OF OTHER NORMAL PREGNANCY, ANTEPARTUM: Primary | ICD-10-CM

## 2020-09-18 DIAGNOSIS — Z23 NEED FOR PROPHYLACTIC VACCINATION AND INOCULATION AGAINST INFLUENZA: ICD-10-CM

## 2020-09-18 PROCEDURE — 99207 ZZC PRENATAL VISIT: CPT | Performed by: OBSTETRICS & GYNECOLOGY

## 2020-09-18 PROCEDURE — 90471 IMMUNIZATION ADMIN: CPT | Performed by: OBSTETRICS & GYNECOLOGY

## 2020-09-18 PROCEDURE — 99000 SPECIMEN HANDLING OFFICE-LAB: CPT | Performed by: OBSTETRICS & GYNECOLOGY

## 2020-09-18 PROCEDURE — 90686 IIV4 VACC NO PRSV 0.5 ML IM: CPT | Performed by: OBSTETRICS & GYNECOLOGY

## 2020-09-18 PROCEDURE — 82105 ALPHA-FETOPROTEIN SERUM: CPT | Mod: 90 | Performed by: OBSTETRICS & GYNECOLOGY

## 2020-09-18 PROCEDURE — 36415 COLL VENOUS BLD VENIPUNCTURE: CPT | Performed by: OBSTETRICS & GYNECOLOGY

## 2020-09-18 ASSESSMENT — MIFFLIN-ST. JEOR: SCORE: 1262.87

## 2020-09-18 NOTE — NURSING NOTE
Clinic Administered Medication Documentation      Injectable Medication Documentation    Patient was given flu vaccine. Prior to medication administration, verified patients identity using patient s name and date of birth. Please see MAR and medication order for additional information. Patient instructed to remain in clinic for 15 minutes.      Was entire vial of medication used? Yes  Vial/Syringe: Syringe  Expiration Date:  6/30/21  Was this medication supplied by the patient? No

## 2020-09-18 NOTE — PROGRESS NOTES
Having a GIRL this time!! Still cannot believe it. NIPT results printed. AFP today. Flu shot today. Feeling very good. Failed early GCT and discussed really needs to do GTT now. RTC with [facility] u/s. BE

## 2020-09-22 DIAGNOSIS — Z34.80 SUPERVISION OF OTHER NORMAL PREGNANCY, ANTEPARTUM: ICD-10-CM

## 2020-09-22 LAB
# FETUSES US: NORMAL
# FETUSES: NORMAL
AFP ADJ MOM AMN: 1.35
AFP SERPL-MCNC: 46 NG/ML
AGE - REPORTED: 37 YR
CURRENT SMOKER: NO
CURRENT SMOKER: NO
DIABETES STATUS PATIENT: NO
FAMILY MEMBER DISEASES HX: NO
FAMILY MEMBER DISEASES HX: NO
GA METHOD: NORMAL
GA METHOD: NORMAL
GA: NORMAL WK
GLUCOSE 1H P 100 G GLC PO SERPL-MCNC: 199 MG/DL (ref 60–179)
GLUCOSE 2H P 100 G GLC PO SERPL-MCNC: 179 MG/DL (ref 60–154)
GLUCOSE 3H P 100 G GLC PO SERPL-MCNC: 131 MG/DL (ref 60–139)
GLUCOSE P FAST SERPL-MCNC: 76 MG/DL (ref 60–94)
IDDM PATIENT QL: NO
INTEGRATED SCN PATIENT-IMP: NORMAL
LMP START DATE: NORMAL
MONOCHORIONIC TWINS: NO
SERVICE CMNT-IMP: NO
SPECIMEN DRAWN SERPL: NORMAL
VALPROIC/CARBAMAZEPINE STATUS: NO
WEIGHT UNITS: NORMAL

## 2020-09-22 PROCEDURE — 36415 COLL VENOUS BLD VENIPUNCTURE: CPT | Performed by: OBSTETRICS & GYNECOLOGY

## 2020-09-22 PROCEDURE — 82951 GLUCOSE TOLERANCE TEST (GTT): CPT | Performed by: OBSTETRICS & GYNECOLOGY

## 2020-09-22 PROCEDURE — 82952 GTT-ADDED SAMPLES: CPT | Performed by: OBSTETRICS & GYNECOLOGY

## 2020-09-23 ENCOUNTER — TELEPHONE (OUTPATIENT)
Dept: OBGYN | Facility: CLINIC | Age: 35
End: 2020-09-23

## 2020-09-23 DIAGNOSIS — O24.419 GDM (GESTATIONAL DIABETES MELLITUS): Primary | ICD-10-CM

## 2020-09-23 RX ORDER — LANCETS
EACH MISCELLANEOUS
Qty: 102 EACH | Refills: 3 | Status: SHIPPED | OUTPATIENT
Start: 2020-09-23 | End: 2021-01-27

## 2020-09-23 RX ORDER — BLOOD SUGAR DIAGNOSTIC
STRIP MISCELLANEOUS
Qty: 150 STRIP | Refills: 3 | Status: SHIPPED | OUTPATIENT
Start: 2020-09-23 | End: 2021-01-27

## 2020-09-23 RX ORDER — BLOOD-GLUCOSE METER
1 EACH MISCELLANEOUS DAILY
Qty: 1 KIT | Refills: 0 | Status: SHIPPED | OUTPATIENT
Start: 2020-09-23 | End: 2021-04-01

## 2020-09-28 ENCOUNTER — PATIENT OUTREACH (OUTPATIENT)
Dept: EDUCATION SERVICES | Facility: CLINIC | Age: 35
End: 2020-09-28
Attending: OBSTETRICS & GYNECOLOGY
Payer: COMMERCIAL

## 2020-09-28 DIAGNOSIS — O24.419 GDM (GESTATIONAL DIABETES MELLITUS): ICD-10-CM

## 2020-09-28 PROCEDURE — G0108 DIAB MANAGE TRN  PER INDIV: HCPCS

## 2020-09-28 NOTE — PATIENT INSTRUCTIONS
"Your 1 week follow-up Diabetes Education visit is scheduled on 10/5/20    1. Check blood sugar 4 times a day, before breakfast and 1 hour after the start of each meal.     2. Check urine ketones when you wake up every morning for 7 days. If negative everyday, reduce testing to once a week.    3. Follow the recommended meal plan: eat something every 2-3 hours, include protein/fat and carbohydrate at every meal and snack, have 30g carbs at breakfast, 45-60g carbs at lunch, 45-60g carbs at supper, 15-30g carbs at 3 snacks per day.     4. Add activity to every day, try walking or being active after each meal to help control blood sugar levels.    5.Call or send a [employer] message to your educator if 3 or more blood sugars are above goal in 1 week, you have an elevated ketone result (trace or higher), or with questions or concerns.      1 cup cooked oats cereal  (1/2 cup dry) -30g  2 tablespoons of nuts     Add spices and cream for flavor, try rolled oats vs. instant.   1 whole grain English muffin (23g)    1 tablespoon of peanut butter  8 oz plain yogurt (8g)  oats (1/4 cup) (15g)  add spices    1 hard-boiled egg (0) Polish toast 2 pc  (whole grain bread)- spread with peanut butter       1 cup breakfast potatoes with the skin. (30g)    Eggs and veggies for a scramble 2 corn tortillas (24g)  Eggs, salsa, guacamole     1 corn tortilla (12g)    cup beans (15g)  Eggs, salsa, guacamole   2/3 cup brown rice (30g)  Eggs, veggies, etc for a \"fried rice\" scramble    1 cup cottage cheese  Instead of eggs    Your choice carbs (30g) Smoothie:  1 cup milk (15g)  Protein powder  1 pc toast (15g)   cup pumpkin puree (11g carb)  1 cup yogurt (8-9g)  Spices and nuts    Tips:  -Try cheese or goat cheese instead of peanut butter  -Try almond, sunflower, or cashew butter for other flavors             [facility] for the [facility]:  For Your Diabetes Education or Nutrition Appointments " Call:  [phone]   For Diabetes Education and Nutrition Related Questions:   Phone: [phone]  Send [employer] Message   If you need a medication refill please contact your pharmacy. Please allow 3 business days for your refills to be completed.

## 2020-09-28 NOTE — PROGRESS NOTES
Diabetes Self-Management Education & Support      SUBJECTIVE/OBJECTIVE:  Presents for education related to gestational diabetes.    Accompanied by: Self  Diabetes management related comments/concerns: I stopped snacking because I'm worried about my blood sugars  Gestational weeks: 17w1d  Number of previous preganancies: 5  Had any babies over 9 lbs: No  Previously had Gestational Diabetes: Yes  Had Diabetes Education before: Yes  Previous insulin or other diabetes medication during that preganancy: Yes(pills)  Have you ever had thyroid problems or taken thyroid medication?: No  Heart disease, mitral valve prolapse or rheumatic fever?: No  Hypertension : No  High Cholesterol: Yes  High Triglycerides: No  Do you use tobacco products?: No  Do you drink beer, wine or hard liquor?: No    Cultural Influences/Ethnic Background:  American-[place]    Estimated Date of Delivery: Mar 7, 2021    1 hour OGTT  Lab Results   Component Value Date    GLU1 191 (H) 08/10/2020       3 hour OGTT    Fasting  Lab Results   Component Value Date    GLF 76 09/22/2020       1 hour  Lab Results   Component Value Date    GL1 199 (H) 09/22/2020       2 hour  Lab Results   Component Value Date    GL2 179 (H) 09/22/2020       3 hour  Lab Results   Component Value Date    GL3 131 09/22/2020       Lifestyle and Health Behaviors:  Pre-pregnancy weight (lbs): 135  Exercise:: Yes  Days per week of moderate to strenuous exercise (like a brisk walk): 3  On average, minutes per day of exercise at this level: 40  How intense was your typical exercise? : Moderate (like brisk walking)  Exercise Minutes per Week: 120  Cultural/Pentecostalism diet restrictions?: No  How many times a week on average do you eat food made away from home (restaurant/take-out)?: 4  Meals include: Breakfast, Lunch, Dinner, Afternoon Snack, Evening Snack  Breakfast: skipped-got up late- will have toast OR cereal OR oatmeal OR eggs with chorizo/ham  Lunch: Chicken salad-crackers-after meal  blood glucose 120 something  Dinner: Chicken sandwich, french fries (mcdonalds)- medium- after 141-142 OR endhilladas OR meat and potatoes  Snacks: nuts or fruit  Beverages: Water  How many servings of fruits/vegetables per day: 3  Biggest challenges to healthy eating: Portion control, Eating out  Pre-nini vitamin?: Yes  Supplements?: No  Experiencing nausea?: No  Experiencing heartburn?: No    Healthy Coping:  Emotional response to diabetes: Ready to learn  Informal Support system:: Family  Stage of change: ACTION (Actively working towards change)    Current Management:  Taking medications for gestational diabetes?: No  Difficulty affording diabetes medication?: No  Difficulty affording diabetes testing supplies?: No    ASSESSMENT:  Needed oral medication with pregnancy 8 years ago, last pregnancy 5 years ago controlled with diet. Eats fast food frequently.    INTERVENTION:    Educational topics covered today:  GDM diagnosis, pathophysiology, Risks and Complications of GDM, Means of controlling GDM, Using a Blood Glucose Monitor, Blood Glucose Goals, Logging and Interpreting Glucose Results, Ketone Testing, When to Call a Diabetes Educator or OB Provider, Healthy Eating During Pregnancy, Counting Carbohydrates, Meal Planning for GDM, and Physical Activity    Educational materials provided today:   [facility] Understanding Gestational Diabetes  GDM Log Book  Sharps Disposal  Care After Delivery    Pt verbalized understanding of concepts discussed and recommendations provided today.     PLAN:  Check glucose 4 times daily, before breakfast and 1 hour after each meal.     Check Ketones daily for one week, if negative, reduce testing to once a week.     Physical activity recommended: walk after dinner as able, encouraged to increase beyond 3 days a week.    Meal plan: 30g carbs at breakfast, 45-60g carbs at lunch, 45-60g carbs at supper, 15-30g carbs at 3 snacks a day.  Follow consistent CHO meal plan, eat CHO and  protein/fat at all meals/snacks.    Call/e-mail/MyChart message diabetes educator if 3 or more blood sugars are above the goal in 1 week, if ketones are positive, or with questions/concerns.    Flory Gutierrez MS, RD, LD, CDE  Time Spent: 50 minutes  Encounter Type: Individual    Any diabetes medication dose changes were made via the CDE Protocol and Collaborative Practice Agreement with the patient's OB/GYN provider. A copy of this encounter was shared with the provider.

## 2020-10-05 ENCOUNTER — VIRTUAL VISIT (OUTPATIENT)
Dept: EDUCATION SERVICES | Facility: CLINIC | Age: 35
End: 2020-10-05
Payer: COMMERCIAL

## 2020-10-05 DIAGNOSIS — O24.419 GDM (GESTATIONAL DIABETES MELLITUS): Primary | ICD-10-CM

## 2020-10-05 PROCEDURE — 98968 PH1 ASSMT&MGMT NQHP 21-30: CPT | Mod: TEL

## 2020-10-05 NOTE — PROGRESS NOTES
Diabetes Self-Management Education & Support  Patient verbally consented to the telephone visit service today: yes      SUBJECTIVE/OBJECTIVE:  Presents for education related to gestational diabetes.    Accompanied by: Self  Diabetes management related comments/concerns: I stopped snacking because I'm worried about my blood sugars  Gestational weeks: 18w1d  Next OB Visit Date: 10/12/20  Number of previous preganancies: 5  Had any babies over 9 lbs: No  Previously had Gestational Diabetes: Yes  Had Diabetes Education before: Yes  Previous insulin or other diabetes medication during that preganancy: Yes(pills)  Have you ever had thyroid problems or taken thyroid medication?: No  Heart disease, mitral valve prolapse or rheumatic fever?: No  Hypertension : No  High Cholesterol: Yes  High Triglycerides: No  Do you use tobacco products?: No  Do you drink beer, wine or hard liquor?: No    Cultural Influences/Ethnic Background:  American    LMP 05/31/2020       Estimated Date of Delivery: Mar 7, 2021    Blood Glucose/Ketone Log:    Date Ketones Fasting Post Breakfast Post Lunch Post Supper   9/28 9/29 n 87 151 142 159  Chicken salad, peas, carrots, potato, pasta, shafer, saltine crackers    Snack: peach   9/30 n 102 128 144 138   10/1 n 84 133 139 105   10/2 n 87 137 140 162  Shrimp, rice 1.5 cups   10/3 n 84 93 107 131   10/4 n 92  Yogurt, nuts    Snack- none 101 175  Chinese food 117    Avocado, tomato, jalapeno, onion, lime, tortilla chips,    10/5 n 87  Eggs, balogna, corn tortilla-2    Snack-cheese, and ritz  136 Will test at 3:30    Avocado, tomato, onion, jalapeno, lime, tortilla chips      Lifestyle and Health Behaviors:  Pre-pregnancy weight (lbs): 135  Exercise:: Yes  Days per week of moderate to strenuous exercise (like a brisk walk): 3  On average, minutes per day of exercise at this level: 40  How intense was your typical exercise? : Moderate (like brisk walking)  Exercise Minutes per Week:  120  Cultural/Jehovah's witness diet restrictions?: No  How many times a week on average do you eat food made away from home (restaurant/take-out)?: 4  Meals include: Breakfast, Lunch, Dinner, Afternoon Snack, Evening Snack  Snacks: nuts or fruit, cheese, crackers, cauliflower, carrots, ranch  Beverages: Water  How many servings of fruits/vegetables per day: 3  Biggest challenges to healthy eating: Portion control, Eating out  Pre-nini vitamin?: Yes  Supplements?: No  Experiencing nausea?: No  Experiencing heartburn?: No    felt starving at fist, then started snacking between meals and it is better.     Healthy Coping:  Emotional response to diabetes: Ready to learn  Informal Support system:: Family  Stage of change: ACTION (Actively working towards change)    Current Management:  Taking medications for gestational diabetes?: No  Difficulty affording diabetes medication?: No  Difficulty affording diabetes testing supplies?: No    ASSESSMENT:  Ketones: negative.   Fasting blood glucoses: 86% in target.  After breakfast: 86% in target.  After lunch: 57% in target.  After dinner: 67% in target.    Post lunch and dinner readings most frequently elevated, though they have improved by the end of the week.  Patient has made significant lifestyle changes to her meal and snack pattern.  Likely elevations caused by higher portions and limited fiber.  She would benefit from adding a serving of protein with her avocado and chip meal.     INTERVENTION:  Educational topics covered today:  What to expect after delivery, Future testing for Type 2 diabetes (2 hour OGTT at 6 week post-partum check-up and annual fasting blood glucose level), Risk of GDM and planning ahead for future pregnancies, Recommended lifestyle interventions for reducing the risk of Type 2 Diabetes, When to Call a Diabetes Educator or OB Provider    Educational Materials provided today:  [facility] Preventing Diabetes    PLAN:  Check glucose 4 times daily.  Check ketones  once a week when readings are consistently negative.  Continue with recommended physical activity recommended walking after lunch and dinner.  Continue to follow recommended meal plan: 30g carbs at breakfast, 45-60g carbs at lunch, 45-60g carbs at supper, 15-30g carbs at snacks.  Follow consistent CHO meal plan, eat CHO and protein/fat at all meals/snacks.    Call/e-mail/MyChart message diabetes educator if 3 or more blood sugars are above the goal in 1 week or if ketones are positive.    Follow up call with CDE 2 weeks.    Flory Gutierrez MS, RD, LD, CDE  Time Spent: 22 minutes  Encounter Type: Individual    Any diabetes medication dose changes were made via the CDE Protocol and Collaborative Practice Agreement with the patient's OB/GYN provider. A copy of this encounter was shared with the provider.

## 2020-10-12 ENCOUNTER — PRENATAL OFFICE VISIT (OUTPATIENT)
Dept: OBGYN | Facility: CLINIC | Age: 35
End: 2020-10-12
Payer: COMMERCIAL

## 2020-10-12 ENCOUNTER — OFFICE VISIT (OUTPATIENT)
Dept: MATERNAL FETAL MEDICINE | Facility: CLINIC | Age: 35
End: 2020-10-12
Attending: OBSTETRICS & GYNECOLOGY
Payer: COMMERCIAL

## 2020-10-12 ENCOUNTER — HOSPITAL ENCOUNTER (OUTPATIENT)
Dept: ULTRASOUND IMAGING | Facility: CLINIC | Age: 35
End: 2020-10-12
Attending: OBSTETRICS & GYNECOLOGY
Payer: COMMERCIAL

## 2020-10-12 VITALS
SYSTOLIC BLOOD PRESSURE: 105 MMHG | BODY MASS INDEX: 24.09 KG/M2 | WEIGHT: 136 LBS | OXYGEN SATURATION: 100 % | HEART RATE: 72 BPM | DIASTOLIC BLOOD PRESSURE: 69 MMHG

## 2020-10-12 DIAGNOSIS — O24.410 DIET CONTROLLED GESTATIONAL DIABETES MELLITUS (GDM), ANTEPARTUM: ICD-10-CM

## 2020-10-12 DIAGNOSIS — O09.522 MULTIGRAVIDA OF ADVANCED MATERNAL AGE IN SECOND TRIMESTER: Primary | ICD-10-CM

## 2020-10-12 DIAGNOSIS — O09.529 ANTEPARTUM MULTIGRAVIDA OF ADVANCED MATERNAL AGE: ICD-10-CM

## 2020-10-12 DIAGNOSIS — Z34.80 SUPERVISION OF OTHER NORMAL PREGNANCY, ANTEPARTUM: Primary | ICD-10-CM

## 2020-10-12 PROCEDURE — 76811 OB US DETAILED SNGL FETUS: CPT

## 2020-10-12 PROCEDURE — 99207 PR PRENATAL VISIT: CPT | Performed by: OBSTETRICS & GYNECOLOGY

## 2020-10-12 PROCEDURE — 76811 OB US DETAILED SNGL FETUS: CPT | Mod: 26 | Performed by: OBSTETRICS & GYNECOLOGY

## 2020-10-12 PROCEDURE — 99207 PR NO CHARGE LOS: CPT | Performed by: OBSTETRICS & GYNECOLOGY

## 2020-10-12 NOTE — PROGRESS NOTES
"Came up from M and everything \"looked good\". Will have repeat in 4 weeks. Has been checking fasting blood sugars  and 1 hour pp only 2 >140. Will meet with diabetic educator in one week. Discussed we no longer do glyburide in pregnancy and if needed, would go on insulin. She was sad to hear this. Maybe starting to feel movement. Feeling well. RTC when has MFM appointment. BE  "

## 2020-10-12 NOTE — PROGRESS NOTES
"Please see \"Imaging\" tab under \"Chart Review\" for details of today's US at the [facility].    Tex Lancaster MD  Maternal-Fetal Medicine      "

## 2020-10-19 ENCOUNTER — VIRTUAL VISIT (OUTPATIENT)
Dept: EDUCATION SERVICES | Facility: CLINIC | Age: 35
End: 2020-10-19
Payer: COMMERCIAL

## 2020-10-19 DIAGNOSIS — O24.419 GDM (GESTATIONAL DIABETES MELLITUS): Primary | ICD-10-CM

## 2020-10-19 PROCEDURE — 98968 PH1 ASSMT&MGMT NQHP 21-30: CPT | Mod: TEL

## 2020-10-19 NOTE — PROGRESS NOTES
Gestational Diabetes Follow-up  Patient verbally consented to the telephone visit service today: yes      Subjective/Objective:    Ella Palacios sent in blood glucose log for review. Last date of communication was: 10/5/20.    Gestational diabetes is being managed with diet and activity    Taking diabetes medications: no    Estimated Date of Delivery: Mar 7, 2021    Blood Glucose/Ketone Log:    Date Ketones Fasting Post Breakfast Post Lunch Post Supper   10/6  87 132 125 111   10/7  84 128  164  Mcdonalds and cake   10/8  96 129 152 110   10/9  99 128 135 130   10/10  87   108   10/11    127 135   10/12  102 139 140 136     Date Ketones Fasting Post Breakfast Post Lunch Post Supper   10/13  96 138 132 107   10/14  96 141 92 157  Pumpkin pie and milk at night    10/15  84  115 180  Burger and Sanders - chicken fries vs. Chicken sandwich Burger juan manuel   10/16  89  132 142   10/17  85 141  124   10/18  85  102 149  Birthday party: pizza, cake   10/19  93 128 118        Assessment:  Some times skips a meal if she is out. She has noticed that if she doesn't stick to the recommended carbohydrates or goes out she will have a high blood sugar. Given known causes of elevated after dinner would recommend trying to limit frequency.      Breakfast: eggs, chorizo OR bologna, corn tortilla 2-3, pickled jalapenos     Ketones: states all negative.   Fasting blood glucoses: 62% in target.   After breakfast: 78% in target.  After lunch: 91% in target.  After dinner: 56% in target.    Response:  Discussed causes for elevated blood sugar when eating out.  Discussed not getting fries OR slightly smaller portions.  Patient reports not eating when running errands, discussed packing snacks to prevent overeating at next meal.     Plan:  Pack snacks for when you are out, order small fries if out OR limit weekly frequency of fast food.     Follow up call with CDE 1 week.     Flory Gutierrez MS, RD, LD, CDE  Total time: 26    Any diabetes  medication dose changes were made via the CDE Protocol and Collaborative Practice Agreement with the patient's OB/GYN provider. A copy of this encounter was shared with the provider.

## 2020-10-27 ENCOUNTER — VIRTUAL VISIT (OUTPATIENT)
Dept: EDUCATION SERVICES | Facility: CLINIC | Age: 35
End: 2020-10-27
Payer: COMMERCIAL

## 2020-10-27 DIAGNOSIS — O24.419 GDM (GESTATIONAL DIABETES MELLITUS): Primary | ICD-10-CM

## 2020-10-27 PROCEDURE — 98967 PH1 ASSMT&MGMT NQHP 11-20: CPT | Mod: TEL

## 2020-10-27 NOTE — PROGRESS NOTES
Gestational Diabetes Follow-up  Patient verbally consented to the telephone visit service today: yes      Subjective/Objective:    Ella Palacios sent in blood glucose log for review. Last date of communication was: 10/19/20.    Gestational diabetes is being managed with diet and activity    Taking diabetes medications: no    Estimated Date of Delivery: Mar 7, 2021   21weeks    Blood Glucose/Ketone Log:  after dinner-139-10/19  Date Ketones Fasting Post Breakfast Post Lunch Post Supper   10/20  85 145 139 131   10/21  89 114 132 106   10/22  94 118 126 145   10/23  85 117 128 145   10/24  88 109 113 157   10/25  80 Didn't eat 142 127  [employer] chicken breast, mashed potatoes     10/26  87 forgot forgot 141  menudo-with 2 pc [place] bread    10/27  83            Assessment:  One dinner was hot dogs with 2 ww buns wrapped in loja with lucero beans.    Post dinner readings are elevated though unclear if it is related to specific carbohydrates OR total grams.  Recommend 2 hour post dinner checks to confirm if she is staying high or her pancrease is slow at hour one.     Not eating tortilla-   Ketones: negative.   Fasting blood glucoses: 100% in target.  After breakfast: 80% in target.  After lunch: 80% in target.  After dinner: 5% in target.    Plan/Response:  Patient to check post dinner at 2 hours with a goal of 120 and record exact dinner meals.    CDE follow up in 3 days to assess if mealtime insulin is safe to recommend, then follow up again scheduled for 11/3.    Flory Gutierrez MS, RD, LD, CDE  Total time: 15 minutes    Any diabetes medication dose changes were made via the CDE Protocol and Collaborative Practice Agreement with the patient's OB/GYN provider. A copy of this encounter was shared with the provider.

## 2020-10-27 NOTE — Clinical Note
Fyi assess Friday if mealtime insulin is safe, then I also have a visit on 11/3 to either titrate OR check in.  Flory

## 2020-10-30 ENCOUNTER — VIRTUAL VISIT (OUTPATIENT)
Dept: EDUCATION SERVICES | Facility: CLINIC | Age: 35
End: 2020-10-30
Payer: COMMERCIAL

## 2020-10-30 DIAGNOSIS — O24.419 GDM (GESTATIONAL DIABETES MELLITUS): Primary | ICD-10-CM

## 2020-10-30 PROCEDURE — 98967 PH1 ASSMT&MGMT NQHP 11-20: CPT | Mod: 95

## 2020-10-30 NOTE — PATIENT INSTRUCTIONS
Continue to record your dinner meals.     Continue to check your blood sugar 2 hours after dinner and 1 hr after the breakfast and lunch.     Let us know if you have questions before your next follow up call next week!

## 2020-10-30 NOTE — PROGRESS NOTES
Gestational Diabetes Follow-up  Patient verbally consented to the telephone visit service today: yes        Subjective/Objective:    Ella Palacios sent in blood glucose log for review. Last date of communication was: 10/27.    Gestational diabetes is being managed with diet    Taking diabetes medications: no    Estimated Date of Delivery: Mar 7, 2021    Blood Glucose/Ketone Log:    Date Ketones Fasting Post Breakfast Post Lunch Post Supper   10/27  83 119 101 100*  Dinner: pork meat with red chili   10/28  86 113 125 91*  Dinner: 6 piece chicken nuggets from [employer]  HS snack a little ice cream and granola bar   10/29  91 119 109 126*   Dinner: salad from [employer] and had a little coke with it  HS bowl of honey nut cheerios which she has never tried   10/30  100 145   Sausage biscuit out to eat this am           Assessment:    Ketones: neg.   Fasting blood glucoses: 75% in target.  After breakfast: 75% in target.  After lunch: 100% in target.  After dinner: 66% in target.    Per last CDE assessment:  Post dinner readings are elevated though unclear if it is related to specific carbohydrates OR total grams.  Recommend 2 hour post dinner checks to confirm if she is staying high or her pancrease is slow at hour one.     The past few days her 2 hr numbers seem to come down nicely and the 91 would suggest waiting on any meal time insulin. However, this was also following a lower carb dinner for her. Her 1 elevated 2 hr reading was likely because she had some pop with dinner so encouraged avoiding any soda.     At this time, I think it would be helpful to monitor her 2 hr post dinner numbers over the weekend and reassess next week to see how her trend continues.     Plan/Response:  Continue to check 2 hr post dinner number.   Continue to record dinner meals.   Check ketones once weekly.   Follow up scheduled 11/3 to review numbers again and assess for potential dinner insulin to be started.     SANDY Souza  CDE  Time spent 12 min      Any diabetes medication dose changes were made via the CDE Protocol and Collaborative Practice Agreement with the patient's OB/GYN provider. A copy of this encounter was shared with the provider.

## 2020-11-03 ENCOUNTER — VIRTUAL VISIT (OUTPATIENT)
Dept: EDUCATION SERVICES | Facility: CLINIC | Age: 35
End: 2020-11-03
Payer: COMMERCIAL

## 2020-11-03 DIAGNOSIS — O24.419 GDM (GESTATIONAL DIABETES MELLITUS): Primary | ICD-10-CM

## 2020-11-03 NOTE — PROGRESS NOTES
Gestational Diabetes Follow-up  Patient verbally consented to the telephone visit service today: yes      Subjective/Objective:    Ella Palacios sent in blood glucose log for review. Last date of communication was: 10/30/2020.    Gestational diabetes is being managed with diet and activity    Taking diabetes medications: no    Estimated Date of Delivery: Mar 7, 2021    Blood Glucose/Ketone Log:    Date Ketones Fasting Post Breakfast Post Lunch Post Supper   10/30  100 145  Sausage biscuit out to eat this am 133 109*   10/31 n 85 142  Can't remember 119 107*   11/1 n 83 111   130 149*  chipotle  Rice, beans, barbacoa, corn, salsa, cheese, sour cream, guacamole   11/2 n 90 146  Avocado toast on wheat bread- 2 pc    106 163*  Rolled tacos-6 with shredded beef and sour cream    No vegetables  Was very stressed   11/3 n 89 123  Usually- eggs, bologna, 2 corn tortillas, sliced jalapenos          Assessment:  Post breakfast and dinner continue to remain in consistent.  11/2 breakfast was likely within the 30g carbohydrate recommendations, but unclear of specific bread used.  Post dinner 2 hour readings are within target and consistent insulin could cause lows.  Recommend continuing to keep close contact with patient for when elevations are consistent.     Ketones: negative.   Fasting blood glucoses: 80% in target.  After breakfast: 40% in target.  After lunch: 100% in target.  After dinner: 50% in target.    Response:  Discussed chipotle order.  Recommended adding lettuce and only consuming 1/2 bowl at a time.  Likely dinner was combination of stress, no vegetables, and 60g total carbohydrate.    Plan:  Follow up phone call in 3 days.    Flory Gutierrez MS, RD, LD, CDE  Total Time: 17 minutes    Any diabetes medication dose changes were made via the CDE Protocol and Collaborative Practice Agreement with the patient's OB/GYN provider. A copy of this encounter was shared with the provider.

## 2020-11-06 ENCOUNTER — VIRTUAL VISIT (OUTPATIENT)
Dept: EDUCATION SERVICES | Facility: CLINIC | Age: 35
End: 2020-11-06
Payer: COMMERCIAL

## 2020-11-06 DIAGNOSIS — O24.419 GDM (GESTATIONAL DIABETES MELLITUS): Primary | ICD-10-CM

## 2020-11-06 PROCEDURE — 98967 PH1 ASSMT&MGMT NQHP 11-20: CPT | Mod: 95

## 2020-11-06 NOTE — Clinical Note
LOLA vásquez said it is okay to call her next week even though she is traveling. Maybe just don't bill her for it?? I explained that she likely needs insulin soon but hard to say given all her eating out the past 2 days..... she should have a more normal diet at her moms the next few days for you.  She is okay if insulin is needed to order it and then have it ready for  when she gets home.... told her to reduce her chaudhary intake.     Kindra Kan, SANDY REDMOND CDE

## 2020-11-12 ENCOUNTER — VIRTUAL VISIT (OUTPATIENT)
Dept: EDUCATION SERVICES | Facility: CLINIC | Age: 35
End: 2020-11-12
Payer: COMMERCIAL

## 2020-11-12 DIAGNOSIS — O24.419 GDM (GESTATIONAL DIABETES MELLITUS): Primary | ICD-10-CM

## 2020-11-12 PROCEDURE — 98966 PH1 ASSMT&MGMT NQHP 5-10: CPT | Mod: TEL

## 2020-11-12 NOTE — PROGRESS NOTES
Gestational Diabetes Follow-up  Patient verbally consented to the telephone visit service today: yes      Subjective/Objective:    Ella Palacios sent in blood glucose log for review. Last date of communication was: 11/6/20.    Gestational diabetes is being managed with diet, activity and medications    Taking diabetes medications: No    Estimated Date of Delivery: Mar 7, 2021, 23w4d    Blood Glucose/Ketone Log:    Date Ketones Fasting Post Breakfast Post Lunch Post Supper   11/6  102 149  Oatmeal with slices of apple from [employer] 115 No dinner due to driving   11/7  100 forgot 127 104    CornTacos-with cheese and meat   11/8  88 136 116 111    Chicken quesadilla (flour), cheese, and guacamole   11/9  96 129 131 105    Chicken fajitas, (corn)   11/10  87 139 115 Forgot  [place] wings, veggies   11/11  95 98 177  Long song magdy Cheese quesadilla, avocado  128   11/12  82 158  Avocado toast, honey wheat         Assessment:  May come back Thursday next week, unsure. She has been walking more this week.  Post meal OR elevated fasting readings explained by either flour tortilla vs. Corn and simple meals with less protein.  Overall blood sugars are improving.  No insulin recommended at this time    Ketones: didn't test this week.   Fasting blood glucoses: 57% in target.  After breakfast: 67% in target.  After lunch: 83% in target.  After dinner: 100% in target.    Plan/Response:  If wanting a flour tortilla try it at lunch vs. Dinner.  Aim for corn tortillas or complex carbohydrates at dinner.    Follow up scheduled 1 week.    Flory Gutierrez MS, RD, LD, CDE  Total time: 10 minutes    Any diabetes medication dose changes were made via the CDE Protocol and Collaborative Practice Agreement with the patient's OB/GYN provider. A copy of this encounter was shared with the provider.

## 2020-11-19 ENCOUNTER — VIRTUAL VISIT (OUTPATIENT)
Dept: EDUCATION SERVICES | Facility: CLINIC | Age: 35
End: 2020-11-19
Payer: COMMERCIAL

## 2020-11-19 DIAGNOSIS — O24.419 GDM (GESTATIONAL DIABETES MELLITUS): Primary | ICD-10-CM

## 2020-11-19 PROCEDURE — 98967 PH1 ASSMT&MGMT NQHP 11-20: CPT | Mod: 95

## 2020-11-19 NOTE — PROGRESS NOTES
Gestational Diabetes Follow-up  Patient verbally consented to the telephone visit service today: yes      Subjective/Objective:    Ella Palacios sent in blood glucose log for review. Last date of communication was: 11/12/20.    Gestational diabetes is being managed with diet, activity and medications    Taking diabetes medications: No    Estimated Date of Delivery: Mar 7, 2021    Blood Glucose/Ketone Log:    Date Ketones Fasting Post Breakfast Post Lunch Post Supper   11/12  82 158  Avocado toast, honey wheat  139  Quesadilla (corn), guacamole   11/13  91 107 163  [place], chips 109  Burgers, cake   11/14  94  157  Burger, fries 94   11/15  92 105  100  2 pc pizza   11/16  92 126 180  2 chimichungas 108  Pizza, yogurt, granola   11/17 11/18     Under 120   11/19  108 163  Red chili, meat, beans, corn tortilla       Meter a sisters house will E-mail in blood sugars from the 11/17-11/19    Assessment:  Patient is still not yet at home and has been eating out more recently.  Recommend continuing weekly follow up to reassess readings as she returns to her home schedule.    Ketones: not discussed.   Fasting blood glucoses: 83% in target.  After breakfast: 60% in target.  After lunch: 0% in target.  After dinner: 100% in target.    Plan/Response:  Patient will E-mail in readings when she has access to her meter.    CDE phone call 11/27/20.    Flory Gutierrez MS, RD, LD, CDE  Total time: 11 minutes    Any diabetes medication dose changes were made via the CDE Protocol and Collaborative Practice Agreement with the patient's OB/GYN provider. A copy of this encounter was shared with the provider.

## 2020-11-27 ENCOUNTER — VIRTUAL VISIT (OUTPATIENT)
Dept: EDUCATION SERVICES | Facility: CLINIC | Age: 35
End: 2020-11-27
Payer: COMMERCIAL

## 2020-11-27 DIAGNOSIS — O24.419 GDM (GESTATIONAL DIABETES MELLITUS): Primary | ICD-10-CM

## 2020-11-27 PROCEDURE — 98966 PH1 ASSMT&MGMT NQHP 5-10: CPT | Mod: TEL

## 2020-11-27 NOTE — PROGRESS NOTES
Gestational Diabetes Follow-up  Patient verbally consented to the telephone visit service today: yes    Subjective/Objective:    Ella Palacios sent in blood glucose log for review. Last date of communication was: 11/19/20.    Gestational diabetes is being managed with diet and activity    Taking diabetes medications: no    Estimated Date of Delivery: Mar 7, 2021    Blood Glucose/Ketone Log:          Assessment:  Home since Sunday.  Some quicker meals due to taking family to appointments.  Once patient had same [employer] meal no difference in dipping sauces.     Ketones: not discussed.   Fasting blood glucoses: 64% in target.  After breakfast: 86% in target.  After lunch: 75% in target.  After dinner: 86% in target.    Plan/Response:  Discussed getting back to usual eating pattern given if she sticks to it blood sugars are within target.    Follow up 12/8/20    Flory Gutierrez MS, RD, LD, CDE  Total time: 6 min    Any diabetes medication dose changes were made via the CDE Protocol and Collaborative Practice Agreement with the patient's OB/GYN provider. A copy of this encounter was shared with the provider.

## 2020-12-01 ENCOUNTER — PRENATAL OFFICE VISIT (OUTPATIENT)
Dept: OBGYN | Facility: CLINIC | Age: 35
End: 2020-12-01
Payer: COMMERCIAL

## 2020-12-01 VITALS
OXYGEN SATURATION: 99 % | SYSTOLIC BLOOD PRESSURE: 105 MMHG | WEIGHT: 140.3 LBS | HEART RATE: 87 BPM | DIASTOLIC BLOOD PRESSURE: 56 MMHG | BODY MASS INDEX: 24.86 KG/M2 | HEIGHT: 63 IN

## 2020-12-01 DIAGNOSIS — Z34.80 SUPERVISION OF OTHER NORMAL PREGNANCY, ANTEPARTUM: Primary | ICD-10-CM

## 2020-12-01 LAB
CAPILLARY BLOOD COLLECTION: NORMAL
HGB BLD-MCNC: 11.9 G/DL (ref 11.7–15.7)

## 2020-12-01 PROCEDURE — 99N1025 PR STATISTIC OBHBG - HEMOGLOBIN: Performed by: OBSTETRICS & GYNECOLOGY

## 2020-12-01 PROCEDURE — 99207 PR PRENATAL VISIT: CPT | Performed by: OBSTETRICS & GYNECOLOGY

## 2020-12-01 PROCEDURE — 36416 COLLJ CAPILLARY BLOOD SPEC: CPT | Performed by: OBSTETRICS & GYNECOLOGY

## 2020-12-01 ASSESSMENT — MIFFLIN-ST. JEOR: SCORE: 1295.53

## 2020-12-01 ASSESSMENT — PATIENT HEALTH QUESTIONNAIRE - PHQ9: SUM OF ALL RESPONSES TO PHQ QUESTIONS 1-9: 1

## 2020-12-01 NOTE — PROGRESS NOTES
No meds and blood sugars 95 or less most all. Pp one hour after breakfast and lunch <mostly 140, 2 hour after dinner <120. Wt gain graph reviewed and normal. Has repeat u/s scheduled but no ECHO, will order today. Declines tubal ligation and will do IUD, not sure which type. Schedule c/s for 37-38 weeks. RTC 4 weeks and tdap then. BE    Childbirth classes? NO  Plan on breastfeeding? Yes  Birthcontrol? IUD  Sex on ultrasound? girl  Circumsion? NA  Peds doc? Emma curtis in [facility]

## 2020-12-02 ENCOUNTER — TELEPHONE (OUTPATIENT)
Dept: OBGYN | Facility: CLINIC | Age: 35
End: 2020-12-02

## 2020-12-02 NOTE — TELEPHONE ENCOUNTER
Type of surgery: ob  Location of surgery: [facility] OR  Date and time of surgery: 2/19/21 830a  Surgeon: Shankar  Pre-Op Appt Date: surgeon to do  Post-Op Appt Date: 6 weeks   Packet sent out: Yes  Pre-cert/Authorization completed:  No  Date: 12/02/20  Joleen Cabrera, Surgery Coordinator

## 2020-12-03 ENCOUNTER — OFFICE VISIT (OUTPATIENT)
Dept: MATERNAL FETAL MEDICINE | Facility: CLINIC | Age: 35
End: 2020-12-03
Attending: OBSTETRICS & GYNECOLOGY
Payer: COMMERCIAL

## 2020-12-03 ENCOUNTER — HOSPITAL ENCOUNTER (OUTPATIENT)
Dept: ULTRASOUND IMAGING | Facility: CLINIC | Age: 35
End: 2020-12-03
Attending: OBSTETRICS & GYNECOLOGY
Payer: COMMERCIAL

## 2020-12-03 DIAGNOSIS — O24.410 DIET CONTROLLED GESTATIONAL DIABETES MELLITUS (GDM), ANTEPARTUM: ICD-10-CM

## 2020-12-03 DIAGNOSIS — O24.410 DIET CONTROLLED GESTATIONAL DIABETES MELLITUS (GDM), ANTEPARTUM: Primary | ICD-10-CM

## 2020-12-03 PROCEDURE — 76816 OB US FOLLOW-UP PER FETUS: CPT | Mod: 26 | Performed by: OBSTETRICS & GYNECOLOGY

## 2020-12-03 PROCEDURE — 76816 OB US FOLLOW-UP PER FETUS: CPT

## 2020-12-03 NOTE — PROGRESS NOTES
"Please see \"Imaging\" tab under \"Chart Review\" for details of today's US at the [facility].    Tex Lancaster MD  Maternal-Fetal Medicine      "

## 2020-12-08 ENCOUNTER — VIRTUAL VISIT (OUTPATIENT)
Dept: EDUCATION SERVICES | Facility: CLINIC | Age: 35
End: 2020-12-08
Payer: COMMERCIAL

## 2020-12-08 DIAGNOSIS — O24.419 GDM (GESTATIONAL DIABETES MELLITUS): Primary | ICD-10-CM

## 2020-12-08 DIAGNOSIS — O24.414 INSULIN CONTROLLED GESTATIONAL DIABETES MELLITUS (GDM) IN SECOND TRIMESTER: Primary | ICD-10-CM

## 2020-12-08 PROCEDURE — 98967 PH1 ASSMT&MGMT NQHP 11-20: CPT | Mod: TEL

## 2020-12-08 RX ORDER — INSULIN ASPART 100 [IU]/ML
2 INJECTION, SOLUTION INTRAVENOUS; SUBCUTANEOUS
Qty: 3 ML | Refills: 3 | Status: SHIPPED | OUTPATIENT
Start: 2020-12-08 | End: 2020-12-22

## 2020-12-08 RX ORDER — PEN NEEDLE, DIABETIC 30 GX3/16"
1 NEEDLE, DISPOSABLE MISCELLANEOUS DAILY
Qty: 100 EACH | Refills: 1 | Status: SHIPPED | OUTPATIENT
Start: 2020-12-08 | End: 2021-04-01

## 2020-12-08 NOTE — TELEPHONE ENCOUNTER
Totally agree and actually warned her last visit was likely to start insulin. :)    Thanks so much!  Isabel Chaparro MD

## 2020-12-08 NOTE — PROGRESS NOTES
Gestational Diabetes Follow-up  Patient verbally consented to the telephone visit service today: yes      Subjective/Objective:    Ella Palacios was called for a scheduled BG review. Last date of communication was: 20.    Gestational diabetes is being managed with diet and activity    Taking diabetes medications: no    Estimated Date of Delivery: 21, currently 27w2d    Blood Glucose/Ketone Lo/8- Fasting- 92, after breakfast- 133    Assessment:  Post breakfast is consistently elevated with the exception of 20 and .  Recommend starting 2 units rapid acting insulin with breakfast.  Fasting blood sugars appears to be rising especially within the past 5 days.  Patient reports having usually about the same breakfast.  20-breakfast was chicken salad.  Breakfast today- red chile with pork and small portion of rice with corn, and chips.     Ketones: negative.   Fasting blood glucoses: 82% in target.  After breakfast: 45% in target.  After lunch: 80% in target.  After dinner: 82% in target.    Plan/Response:  Discussed starting insulin at breakfast why it is recommended, given no recent total carbohydrate change and how to take it. Patient verbalized understanding    Recommend starting insulin at breakfast: 2-0-0-0 Novolog/humalog, then testing breakfast readings 2 hours after eating.    Follow up  for initial insulin questions.    Flory Gutierrez MS, RD, LD, CDE    Time Spent: 15 minutes    Any diabetes medication dose changes were made via the CDE Protocol and Collaborative Practice Agreement with the patient's OB/GYN provider. A copy of this encounter was shared with the provider.

## 2020-12-08 NOTE — PATIENT INSTRUCTIONS
"Taking Insulin:    This is a video link of how to use various diabetes injectable medications, please select your medication.  http://www.[facility].org/clinical-integration/health-resources/diabetes/index.htm    1. Take NovoLog - 2 units at breakfast.     - Do a 2 unit \"prime\" before each injection, be sure a stream of insulin comes out of the needle before you give your injection.    - After you inject, hold the needle under the skin to the count of 10 to be sure all of the insulin goes in.     - Rotate injection sites, keeping at least 1 inch apart from last injection site and 2 inches away from belly button or surgical scars.    2. Pen - Use a new pen needle for each injection. Always remove pen needle from the insulin pen after use and do not store insulin pens with the needle on the pen.     3. Store insulin you are not using in the refrigerator (do not freeze). Take new insulin out of the refrigerator a few hours prior to use to bring to room temperature.     4. Once opened Novolog can be kept at room temperature for 28 days after opened.. Do not use the opened insulin after this time has passed, even if there is still medicine inside.     5. Always carry your blood sugar meter and a sugar source like glucose tablets with you in case of a low blood sugar. Treat a low blood sugar (less than 70) with 15 grams of carbohydrate (1 carb choice). Wait 15 minutes, recheck blood sugar. If blood sugar is still below 70, repeat the treatment.    6. Wear Medical ID or carry a wallet card stating you have Diabetes.    7. Call your doctor or diabetes educator if you begin having low blood sugars or if you have questions or concerns.     8. Complete and mail in the form to inform the [place] [employer] that you have started taking insulin.    9. Follow-up: Call ([phone]), e-mail ([email or web address]), or send [employer] message to educator with blood sugar readings in 1 week.    [facility] " Education and Nutrition Services for the [facility]:  For Your Diabetes Education or Nutrition Appointments Call:  [phone]   For Diabetes or Nutrition Related Questions:   Phone: [phone]  Send [employer] Message   If you need a medication refill please contact your pharmacy. Please allow 3 business days for your refills to be completed.

## 2020-12-08 NOTE — TELEPHONE ENCOUNTER
NOTE TO PROVIDER REQUESTING MEDICATION ORDERS:    Ella MCCOY Suzanne glucose are above target at breakfast    Current diabetes medications:  no.       Recommend starting 2-0-0-0 novolog.       Patient follow up plan CDE to call patient 12/11/20.      Orders pended. If in agreement, please note approval and sign pended orders, otherwise please indicate an alternate plan. Route back to writer to notify the patient.       Flory Gutierrez MS, RD, LD, CDE

## 2020-12-11 ENCOUNTER — VIRTUAL VISIT (OUTPATIENT)
Dept: EDUCATION SERVICES | Facility: CLINIC | Age: 35
End: 2020-12-11
Payer: COMMERCIAL

## 2020-12-11 DIAGNOSIS — O24.414 INSULIN CONTROLLED GESTATIONAL DIABETES MELLITUS (GDM) IN SECOND TRIMESTER: Primary | ICD-10-CM

## 2020-12-11 PROCEDURE — 98966 PH1 ASSMT&MGMT NQHP 5-10: CPT | Mod: TEL

## 2020-12-11 NOTE — PROGRESS NOTES
Gestational Diabetes Follow-up  Patient verbally consented to the telephone visit service today: yes      Subjective/Objective:    Ella Palacios was called for a scheduled BG review. Last date of communication was: 12/8/20.    Gestational diabetes is being managed with diet, activity and medications    Taking diabetes medications:   yes:     Diabetes Medication(s)     Insulin       insulin aspart (NOVOLOG FLEXPEN) 100 UNIT/ML pen    Inject 2 Units Subcutaneous every morning (before breakfast)          Estimated Date of Delivery: Mar 7, 2021, 27w5d    Blood Glucose/Ketone Log:    Date Ketones Fasting Post Breakfast Post Lunch Post Supper   12/8  92 133 119 144  Chicken strips, cheese curds, fries- mira's   12/9 Started insulin 87 125 137 106  Zucchini, corn, green chili, sour cream, cheese, beans   12/10  92 130* 127 103  Red chili with meat   12/11  92 112*         Assessment:  Blood sugar in target today after breakfast, and one elevated reading for fast food.     Ketones: none.   Fasting blood glucoses: 100% in target.  After breakfast: 50% in target.  After lunch: 100% in target.  After dinner: 67% in target.    Plan/Response:  Discussed increasing Novolog dose to 3 units if she has another elevated after breakfast reading before next check in.    If having culvers OR other similar high carbohydrate meal she knows will cause elevated readings OK to take 2 units with the meal.    Flory Gutierrez MS, RD, LD, CDE    Time Spent: 8 minutes    Any diabetes medication dose changes were made via the CDE Protocol and Collaborative Practice Agreement with the patient's OB/GYN provider. A copy of this encounter was shared with the provider.

## 2020-12-17 ENCOUNTER — VIRTUAL VISIT (OUTPATIENT)
Dept: EDUCATION SERVICES | Facility: CLINIC | Age: 35
End: 2020-12-17
Payer: COMMERCIAL

## 2020-12-17 DIAGNOSIS — O24.414 INSULIN CONTROLLED GESTATIONAL DIABETES MELLITUS (GDM) IN SECOND TRIMESTER: Primary | ICD-10-CM

## 2020-12-17 PROCEDURE — 98966 PH1 ASSMT&MGMT NQHP 5-10: CPT | Mod: TEL

## 2020-12-17 NOTE — PROGRESS NOTES
Gestational Diabetes Follow-up    Subjective/Objective:    Ella Palacios was called for a scheduled BG review. Last date of communication was: 12/11/20.    Gestational diabetes is being managed with diet, activity and medications    Taking diabetes medications:   yes:     Diabetes Medication(s)     Insulin       insulin aspart (NOVOLOG FLEXPEN) 100 UNIT/ML pen    Inject 2 Units Subcutaneous every morning (before breakfast)          Estimated Date of Delivery: Mar 7, 2021, 28w4d    Blood Glucose/Ketone Log:        Assessment:  Past 2 days elevated fasting readings. Patient can't remember bedtime snack, but knows it was different than the night before.  Possibly sugared yogurt, but not fruit.      Ketones: none reported.   Fasting blood glucoses: 82% in target.  After breakfast: 100% in target, only 1 new number to assess.  After lunch: 100% in target.  After dinner: 89% in target.    Plan/Response:  No dose change for insulin recommended today, but requested more after breakfast readings for complete assessment.    Requested she record bedtime snacks for next 4 days to assess if growth hormones OR food are responsible for increased fasting numbers.    CDE to call patient 12/22/20    Flory Gutierrez MS, RD, LD, CDE  Time Spent: 5 minutes    Any diabetes medication dose changes were made via the CDE Protocol and Collaborative Practice Agreement with the patient's OB/GYN provider. A copy of this encounter was shared with the provider.

## 2020-12-22 ENCOUNTER — VIRTUAL VISIT (OUTPATIENT)
Dept: EDUCATION SERVICES | Facility: CLINIC | Age: 35
End: 2020-12-22
Payer: COMMERCIAL

## 2020-12-22 DIAGNOSIS — O24.414 INSULIN CONTROLLED GESTATIONAL DIABETES MELLITUS (GDM) IN SECOND TRIMESTER: ICD-10-CM

## 2020-12-22 PROCEDURE — 98967 PH1 ASSMT&MGMT NQHP 11-20: CPT | Mod: TEL

## 2020-12-22 RX ORDER — INSULIN ASPART 100 [IU]/ML
INJECTION, SOLUTION INTRAVENOUS; SUBCUTANEOUS
Qty: 3 ML | Refills: 3 | Status: SHIPPED | OUTPATIENT
Start: 2020-12-22 | End: 2021-01-05

## 2020-12-22 NOTE — PROGRESS NOTES
Gestational Diabetes Follow-up  Patient verbally consented to the telephone visit service today: yes      Subjective/Objective:    Ella Palacios was called for a scheduled BG review. Last date of communication was: 20.    Gestational diabetes is being managed with diet, activity and medications    Taking diabetes medications:   yes:     Diabetes Medication(s)     Insulin       insulin aspart (NOVOLOG FLEXPEN) 100 UNIT/ML pen    Inject 2 Units Subcutaneous every morning (before breakfast)          Estimated Date of Delivery: 21    Blood Glucose/Ketone Lo/17- 1 cup beans, butter, cheese, guacamole ate with lots of tortilla chips - no insulin with this meal  - hot dog with white bun- likely had 2 buns  - took 3 units insulin with dinner-149 was 2 hours after   - 1 hotdog with bun  - [employer] took tried 3 units  -2 hours after eating tested  -fasting 99     Assessment:  Not yet consistently elevated at dinner, but she is starting to recognize high carbohydrate meals and did try insulin with dinner.  Recommend testing 2 hours after dinner and if consistently above 120 start a base insulin dose of 2 units.  If having high carbohydrate meals at dinner then take 6 units.  No change to breakfast needed.  Will assess if improving dinner is enough to bring fasting reading down.     Ketones: not discussed.   Fasting blood glucoses: 43% in target.  After breakfast: 100% in target.  After lunch: 100% in target.  After dinner: 57% in target.    Plan/Response:  Start testing Dinner at 2 hours after and continue 2 hours after breakfast.    If post dinner is elevated at 2 hours add in 2 units novolog with all dinner.  IF having a high carbohydrate dinner take 6 units novolog.      Flory Gutierrez MS, RD, LD, CDE  Time Spent: 12 minutes    Any diabetes medication dose changes were made via the CDE Protocol and Collaborative Practice Agreement with the patient's OB/GYN provider. A copy of this  encounter was shared with the provider.

## 2020-12-28 ENCOUNTER — PRENATAL OFFICE VISIT (OUTPATIENT)
Dept: OBGYN | Facility: CLINIC | Age: 35
End: 2020-12-28
Payer: COMMERCIAL

## 2020-12-28 VITALS
DIASTOLIC BLOOD PRESSURE: 68 MMHG | SYSTOLIC BLOOD PRESSURE: 111 MMHG | HEART RATE: 102 BPM | OXYGEN SATURATION: 97 % | TEMPERATURE: 97 F | BODY MASS INDEX: 24.98 KG/M2 | WEIGHT: 141 LBS

## 2020-12-28 DIAGNOSIS — Z23 NEED FOR TDAP VACCINATION: Primary | ICD-10-CM

## 2020-12-28 PROCEDURE — 90715 TDAP VACCINE 7 YRS/> IM: CPT | Performed by: OBSTETRICS & GYNECOLOGY

## 2020-12-28 PROCEDURE — 90471 IMMUNIZATION ADMIN: CPT | Performed by: OBSTETRICS & GYNECOLOGY

## 2020-12-28 PROCEDURE — 99207 PR PRENATAL VISIT: CPT | Performed by: OBSTETRICS & GYNECOLOGY

## 2020-12-28 NOTE — PROGRESS NOTES
On insulin before breakfast. fastings 80-90's and still working on blood sugar control. Next u/s is this week. tdap today. Plan RTC 3 weeks and then another 3 and discuss c/s then. BE

## 2020-12-28 NOTE — NURSING NOTE
Clinic Administered Medication Documentation      Injectable Medication Documentation    Patient was given tdap. Prior to medication administration, verified patients identity using patient s name and date of birth. Please see MAR and medication order for additional information. Patient instructed to remain in clinic for 15 minutes.      Was entire vial of medication used? Yes  Vial/Syringe: Single dose vial  Expiration Date:  9/17/22  Was this medication supplied by the patient? No

## 2020-12-29 ENCOUNTER — VIRTUAL VISIT (OUTPATIENT)
Dept: EDUCATION SERVICES | Facility: CLINIC | Age: 35
End: 2020-12-29
Payer: COMMERCIAL

## 2020-12-29 DIAGNOSIS — O24.414 INSULIN CONTROLLED GESTATIONAL DIABETES MELLITUS (GDM) IN SECOND TRIMESTER: Primary | ICD-10-CM

## 2020-12-29 NOTE — PROGRESS NOTES
Gestational Diabetes Follow-up    Subjective/Objective:    Ella Palacios was called for a scheduled BG review. Last date of communication was: 12/22/20.    Gestational diabetes is being managed with diet, activity and medications.     Taking diabetes medications:   yes:     Diabetes Medication(s)     Insulin       insulin aspart (NOVOLOG FLEXPEN) 100 UNIT/ML pen    Take 2 units with breakfast and up to 6 units at dinner.  Max TDD= 12 units with prime          Estimated Date of Delivery: Mar 7, 2021    Blood Glucose/Ketone Log:    Date Ketones Fasting Post Breakfast Post Lunch Post Supper   12/22  99 90* 131 149  Bean burrito, cheese, pickles   12/23  94  115*  Chick nain et- took 6 units novolog    12/24  80 98* 107 136*   12/25  90   159*  No insulin  Ate bread with pazole   12/26  98 120* 117*  Took 6 units-red chile, rice, small pc chocolate cake     12/27  98   148*   12/29  88        Assessment:  12/28- didn't eat all day, ate 2 pc pizza for dinner didn't take insulin with it.    Ketones: not discussed.   Fasting blood glucoses: 57% in target.  After breakfast: 67% in target.  After lunch: 100% in target.  After dinner: 0% in target.    Plan/Response:  Recommend continuing 2 units Novolog with breakfast, continue 6 units as needed with lunch when she eats out, and take 6 units consistently with dinner.  IF after 3 days of consistent novolog use with dinner post meal remains elevated increase to 8 units.    Follow up scheduled 1/5/20    Flory Gutierrez MS, RD, LD, CDE    Time Spent: 9 minutes    Any diabetes medication dose changes were made via the CDE Protocol and Collaborative Practice Agreement with the patient's OB/GYN provider. A copy of this encounter was shared with the provider.

## 2020-12-31 ENCOUNTER — OFFICE VISIT (OUTPATIENT)
Dept: MATERNAL FETAL MEDICINE | Facility: CLINIC | Age: 35
End: 2020-12-31
Attending: OBSTETRICS & GYNECOLOGY
Payer: COMMERCIAL

## 2020-12-31 ENCOUNTER — HOSPITAL ENCOUNTER (OUTPATIENT)
Dept: ULTRASOUND IMAGING | Facility: CLINIC | Age: 35
End: 2020-12-31
Attending: OBSTETRICS & GYNECOLOGY
Payer: COMMERCIAL

## 2020-12-31 DIAGNOSIS — O24.414 INSULIN CONTROLLED GESTATIONAL DIABETES MELLITUS (GDM) IN THIRD TRIMESTER: Primary | ICD-10-CM

## 2020-12-31 DIAGNOSIS — O24.410 DIET CONTROLLED GESTATIONAL DIABETES MELLITUS (GDM), ANTEPARTUM: ICD-10-CM

## 2020-12-31 PROCEDURE — 76816 OB US FOLLOW-UP PER FETUS: CPT | Mod: 26 | Performed by: OBSTETRICS & GYNECOLOGY

## 2020-12-31 PROCEDURE — 76816 OB US FOLLOW-UP PER FETUS: CPT

## 2021-01-01 NOTE — PROGRESS NOTES
Please see ultrasound report under Imaging tab for details of today's ultrasound.    Sharonda Restrepo MD  Maternal-Fetal Medicine

## 2021-01-04 PROBLEM — Z34.80 SUPERVISION OF OTHER NORMAL PREGNANCY, ANTEPARTUM: Status: ACTIVE | Noted: 2020-08-10

## 2021-01-05 ENCOUNTER — VIRTUAL VISIT (OUTPATIENT)
Dept: EDUCATION SERVICES | Facility: CLINIC | Age: 36
End: 2021-01-05
Payer: COMMERCIAL

## 2021-01-05 DIAGNOSIS — O24.414 INSULIN CONTROLLED GESTATIONAL DIABETES MELLITUS (GDM) IN SECOND TRIMESTER: ICD-10-CM

## 2021-01-05 DIAGNOSIS — O24.419 GDM (GESTATIONAL DIABETES MELLITUS): Primary | ICD-10-CM

## 2021-01-05 PROCEDURE — 98966 PH1 ASSMT&MGMT NQHP 5-10: CPT | Mod: TEL

## 2021-01-05 RX ORDER — INSULIN ASPART 100 [IU]/ML
INJECTION, SOLUTION INTRAVENOUS; SUBCUTANEOUS
Qty: 15 ML | Refills: 3 | Status: SHIPPED | OUTPATIENT
Start: 2021-01-05 | End: 2021-01-12

## 2021-01-05 NOTE — Clinical Note
LOLA I asked her to E-mail in Fridays before noon for a 2nd weekly dose titration.  She has LGA and I have weekly calls planned for the rest of the pregnancy.  Should be quick E-mail.   Flory Gutierrez MS, RD, LD, CDE

## 2021-01-12 ENCOUNTER — VIRTUAL VISIT (OUTPATIENT)
Dept: EDUCATION SERVICES | Facility: CLINIC | Age: 36
End: 2021-01-12
Payer: COMMERCIAL

## 2021-01-12 DIAGNOSIS — O24.414 INSULIN CONTROLLED GESTATIONAL DIABETES MELLITUS (GDM) IN SECOND TRIMESTER: ICD-10-CM

## 2021-01-12 DIAGNOSIS — Z11.59 ENCOUNTER FOR SCREENING FOR OTHER VIRAL DISEASES: ICD-10-CM

## 2021-01-12 RX ORDER — INSULIN ASPART 100 [IU]/ML
INJECTION, SOLUTION INTRAVENOUS; SUBCUTANEOUS
Qty: 15 ML | Refills: 3 | Status: SHIPPED | OUTPATIENT
Start: 2021-01-12 | End: 2021-01-19

## 2021-01-12 NOTE — PROGRESS NOTES
Gestational Diabetes Follow-up  Type of Service: Telephone Visit    How would patient like to obtain AVS? [place]      Subjective/Objective:    Ella Palacios was called for a scheduled BG review. Last date of communication was: 1/5/21.    Gestational diabetes is being managed with diet, activity and medications    Taking diabetes medications:   yes:     Diabetes Medication(s)     Insulin       insulin aspart (NOVOLOG FLEXPEN) 100 UNIT/ML pen    Take 4 units with breakfast, 4 units with lunch, and up to 11 units at dinner.  Max TDD= 25 units with prime          Estimated Date of Delivery: Mar 7, 2021    Blood Glucose/Ketone Log:        Assessment:  Elevated post lunch blood sugars with above target pre dinner reading if after lunch is significantly elevated.  Recommend increase to lunch dose.     Ketones: none.   Fasting blood glucoses: 71% in target.  After breakfast: 83% in target.  After lunch: 57% in target.  Before dinner: 50% in target.  After dinner: 86% in target.    Plan/Response:  Recommend increase to insulin - Novolog 4-4-10-0 -->4-6-10/11-0, if post lunch continues to be elevated after 2 days, then further increase to 8 units at lunch.  Discussed self increase 2 units between visits for elevated readings.  Follow up call     Flory Gutierrez MS, RD, LD, CDE  Time Spent: 4 minutes    Any diabetes medication dose changes were made via the CDE Protocol and Collaborative Practice Agreement with the patient's primary care provider. A copy of this encounter was shared with the provider.

## 2021-01-14 ENCOUNTER — HOSPITAL ENCOUNTER (OUTPATIENT)
Dept: ULTRASOUND IMAGING | Facility: CLINIC | Age: 36
End: 2021-01-14
Attending: OBSTETRICS & GYNECOLOGY
Payer: COMMERCIAL

## 2021-01-14 ENCOUNTER — OFFICE VISIT (OUTPATIENT)
Dept: MATERNAL FETAL MEDICINE | Facility: CLINIC | Age: 36
End: 2021-01-14
Attending: OBSTETRICS & GYNECOLOGY
Payer: COMMERCIAL

## 2021-01-14 DIAGNOSIS — O24.414 INSULIN CONTROLLED GESTATIONAL DIABETES MELLITUS (GDM) IN THIRD TRIMESTER: ICD-10-CM

## 2021-01-14 DIAGNOSIS — O26.90 PREGNANCY RELATED CONDITION, ANTEPARTUM: Primary | ICD-10-CM

## 2021-01-14 PROCEDURE — 76819 FETAL BIOPHYS PROFIL W/O NST: CPT | Mod: 26 | Performed by: OBSTETRICS & GYNECOLOGY

## 2021-01-14 PROCEDURE — 76819 FETAL BIOPHYS PROFIL W/O NST: CPT

## 2021-01-18 ENCOUNTER — OFFICE VISIT (OUTPATIENT)
Dept: MATERNAL FETAL MEDICINE | Facility: CLINIC | Age: 36
End: 2021-01-18
Attending: OBSTETRICS & GYNECOLOGY
Payer: COMMERCIAL

## 2021-01-18 ENCOUNTER — HOSPITAL ENCOUNTER (OUTPATIENT)
Dept: ULTRASOUND IMAGING | Facility: CLINIC | Age: 36
End: 2021-01-18
Attending: OBSTETRICS & GYNECOLOGY
Payer: COMMERCIAL

## 2021-01-18 DIAGNOSIS — O24.414 INSULIN CONTROLLED GESTATIONAL DIABETES MELLITUS (GDM) IN THIRD TRIMESTER: Primary | ICD-10-CM

## 2021-01-18 DIAGNOSIS — O24.414 INSULIN CONTROLLED GESTATIONAL DIABETES MELLITUS (GDM) IN THIRD TRIMESTER: ICD-10-CM

## 2021-01-18 PROCEDURE — 76819 FETAL BIOPHYS PROFIL W/O NST: CPT | Mod: 26 | Performed by: OBSTETRICS & GYNECOLOGY

## 2021-01-18 PROCEDURE — 76819 FETAL BIOPHYS PROFIL W/O NST: CPT

## 2021-01-18 NOTE — PROGRESS NOTES
Ella Hallcon was seen for an ultrasound today at the [facility]etal Medicine center.      For the details of the ultrasound please see the report which can be found under the imaging tab.      Cheryl He MD  , OB/GYN  Maternal-Fetal Medicine  [email or web address]  [phone] (Main MFM Office)  [phone] or [phone] (for 24 hour MFM questions)  [phone] (Pager);

## 2021-01-19 ENCOUNTER — PATIENT OUTREACH (OUTPATIENT)
Dept: EDUCATION SERVICES | Facility: CLINIC | Age: 36
End: 2021-01-19
Payer: COMMERCIAL

## 2021-01-19 ENCOUNTER — PRENATAL OFFICE VISIT (OUTPATIENT)
Dept: OBGYN | Facility: CLINIC | Age: 36
End: 2021-01-19
Payer: COMMERCIAL

## 2021-01-19 VITALS
HEART RATE: 84 BPM | HEIGHT: 63 IN | DIASTOLIC BLOOD PRESSURE: 68 MMHG | SYSTOLIC BLOOD PRESSURE: 104 MMHG | BODY MASS INDEX: 25.76 KG/M2 | OXYGEN SATURATION: 98 % | WEIGHT: 145.4 LBS

## 2021-01-19 DIAGNOSIS — O24.414 INSULIN CONTROLLED GESTATIONAL DIABETES MELLITUS (GDM) IN SECOND TRIMESTER: ICD-10-CM

## 2021-01-19 DIAGNOSIS — Z34.80 SUPERVISION OF OTHER NORMAL PREGNANCY, ANTEPARTUM: Primary | ICD-10-CM

## 2021-01-19 PROCEDURE — 98967 PH1 ASSMT&MGMT NQHP 11-20: CPT | Mod: TEL

## 2021-01-19 PROCEDURE — 99207 PR PRENATAL VISIT: CPT | Performed by: OBSTETRICS & GYNECOLOGY

## 2021-01-19 RX ORDER — INSULIN ASPART 100 [IU]/ML
INJECTION, SOLUTION INTRAVENOUS; SUBCUTANEOUS
Qty: 15 ML | Refills: 3 | Status: SHIPPED | OUTPATIENT
Start: 2021-01-19 | End: 2021-01-26

## 2021-01-19 ASSESSMENT — MIFFLIN-ST. JEOR: SCORE: 1318.66

## 2021-01-19 NOTE — PROGRESS NOTES
Adjusting insulin for blood sugars. Has next appt with me 2/11--GBS, discuss all of c/s then and discharge meds. Has been feeling good. BE

## 2021-01-19 NOTE — PROGRESS NOTES
Gestational Diabetes Follow-up  Type of Service: Telephone Visit    How would patient like to obtain AVS? [place]    Subjective/Objective:    Ella Palacios was called for a scheduled BG review. Last date of communication was: 1/12/21.    Gestational diabetes is being managed with diet, activity and medications    Taking diabetes medications:   yes:     Diabetes Medication(s)     Insulin       insulin aspart (NOVOLOG FLEXPEN) 100 UNIT/ML pen    Take 4 units with breakfast, 6 units with lunch, and up to 11 units at dinner.  Max TDD= 25 units with prime          Estimated Date of Delivery: 2/19/21    Blood Glucose/Ketone Log:    Readings are 2 hours    Assessment:  Some fasting blood sugars elevated, but not consistently.  Post lunch readings consistently elevated. Has stopped bedtime snacks, but unsure why she did this.    1/17-Dinner- cant remember  1/18-lunch- PB and J (whole wheat) and milk- took 8 units novolog      Ketones: none reported.   Fasting blood glucoses: 71% in target.  After breakfast: 100% in target.  After lunch: 57% in target.  After dinner: 71% in target.    Plan/Response:  Follow-up in 1 week.    Increase insulin Novolog 4-6-11-0-->4-8-11-0    Check ketones.     Send in Friday if seeing a pattern of elevated, especially fasting to start NPH at bedtime.     Flory Gutierrez MS, RD, LD, CDE  Time Spent: 15 minutes    Any diabetes medication dose changes were made via the CDE Protocol and Collaborative Practice Agreement with the patient's OB/GYN provider. A copy of this encounter was shared with the provider.

## 2021-01-21 ENCOUNTER — HOSPITAL ENCOUNTER (OUTPATIENT)
Dept: ULTRASOUND IMAGING | Facility: CLINIC | Age: 36
End: 2021-01-21
Attending: OBSTETRICS & GYNECOLOGY
Payer: COMMERCIAL

## 2021-01-21 ENCOUNTER — OFFICE VISIT (OUTPATIENT)
Dept: MATERNAL FETAL MEDICINE | Facility: CLINIC | Age: 36
End: 2021-01-21
Attending: OBSTETRICS & GYNECOLOGY
Payer: COMMERCIAL

## 2021-01-21 DIAGNOSIS — O24.414 INSULIN CONTROLLED GESTATIONAL DIABETES MELLITUS (GDM) IN THIRD TRIMESTER: Primary | ICD-10-CM

## 2021-01-21 DIAGNOSIS — O24.414 INSULIN CONTROLLED GESTATIONAL DIABETES MELLITUS (GDM) IN THIRD TRIMESTER: ICD-10-CM

## 2021-01-21 PROCEDURE — 76819 FETAL BIOPHYS PROFIL W/O NST: CPT | Mod: 26 | Performed by: OBSTETRICS & GYNECOLOGY

## 2021-01-21 PROCEDURE — 76819 FETAL BIOPHYS PROFIL W/O NST: CPT

## 2021-01-21 NOTE — PROGRESS NOTES
Please refer to ultrasound report under 'Imaging' Studies of 'Chart Review' tabs.    Go Dee M.D.

## 2021-01-22 ENCOUNTER — TELEPHONE (OUTPATIENT)
Dept: EDUCATION SERVICES | Facility: CLINIC | Age: 36
End: 2021-01-22

## 2021-01-22 NOTE — TELEPHONE ENCOUNTER
Gestational Diabetes Follow-up    Subjective/Objective:    Ella Palacios sent in blood glucose log for review. Last date of communication was: 1-19-21.    Gestational diabetes is being managed with diet, activity and medications    Taking diabetes medications:   yes:     Diabetes Medication(s)     Insulin       insulin aspart (NOVOLOG FLEXPEN) 100 UNIT/ML pen    Take 4 units with breakfast, 8 units with lunch, and up to 11 units at dinner.  Max TDD= 25 units with prime          Estimated Date of Delivery: Mar 7, 2021    BG/Food Log:       Assessment:    Ketones: Not noted.   Fasting blood glucoses: 50% in target.  After breakfast: 66% in target. (Did 10 units at breakfast for high carb meal the 1 day it was high)  After lunch: 100% in target.(only 1 BG)  Before dinner: 100% in target.  After dinner: 100% in target.    Plan/Response:  Meal Plan Recommendation: Consider adding small snack in the night (glass of milk, small granola bar, or some crackers) to see if that helps with fasting BG's.  Check ketones every morning until next appt.  No changes in the patient's current insulin plan of Novolog 4-8-11-0.  Follow-up on Tuesday 1-26 as scheduled.  Email sent to pt:    Dear Ella,    Thank you so much for sending in your records! After reviewing your records these are my recommendations:    Insulin doses: No changes.    Meal plan: Consider adding small snack in the night of about 10-15 grams of carbohydrate (glass of milk, small granola bar, or some crackers) to see if that helps with fasting BG's.      Blood sugar testing: No changes.    Ketone testing: Check ketones every morning until next appt.     Follow-up: Please send records for appt on January 26th with Flory    Please feel free to reach out sooner with any questions or concerns. Thanks so much!    Krupa Coon RN, [facility]      Any diabetes medication dose changes were made via the CDE Protocol and Collaborative Practice Agreement with the patient's  OB/GYN provider. A copy of this encounter was shared with the provider.

## 2021-01-25 ENCOUNTER — OFFICE VISIT (OUTPATIENT)
Dept: MATERNAL FETAL MEDICINE | Facility: CLINIC | Age: 36
End: 2021-01-25
Attending: OBSTETRICS & GYNECOLOGY
Payer: COMMERCIAL

## 2021-01-25 ENCOUNTER — HOSPITAL ENCOUNTER (OUTPATIENT)
Dept: ULTRASOUND IMAGING | Facility: CLINIC | Age: 36
End: 2021-01-25
Attending: OBSTETRICS & GYNECOLOGY
Payer: COMMERCIAL

## 2021-01-25 DIAGNOSIS — O26.90 PREGNANCY RELATED CONDITION, ANTEPARTUM: Primary | ICD-10-CM

## 2021-01-25 DIAGNOSIS — O24.414 INSULIN CONTROLLED GESTATIONAL DIABETES MELLITUS (GDM) IN THIRD TRIMESTER: ICD-10-CM

## 2021-01-25 PROCEDURE — 76819 FETAL BIOPHYS PROFIL W/O NST: CPT

## 2021-01-25 PROCEDURE — 76819 FETAL BIOPHYS PROFIL W/O NST: CPT | Mod: 26 | Performed by: OBSTETRICS & GYNECOLOGY

## 2021-01-26 ENCOUNTER — VIRTUAL VISIT (OUTPATIENT)
Dept: EDUCATION SERVICES | Facility: CLINIC | Age: 36
End: 2021-01-26
Payer: COMMERCIAL

## 2021-01-26 DIAGNOSIS — O24.414 INSULIN CONTROLLED GESTATIONAL DIABETES MELLITUS (GDM) IN SECOND TRIMESTER: ICD-10-CM

## 2021-01-26 RX ORDER — INSULIN ASPART 100 [IU]/ML
INJECTION, SOLUTION INTRAVENOUS; SUBCUTANEOUS
Qty: 15 ML | Refills: 3 | Status: SHIPPED | OUTPATIENT
Start: 2021-01-26 | End: 2021-01-29

## 2021-01-26 NOTE — PROGRESS NOTES
Gestational Diabetes Follow-up  Type of Service: Telephone Visit    How would patient like to obtain AVS? [place]    Subjective/Objective:    Ella Palacios was called for a scheduled BG review. Last date of communication was: 21.    Gestational diabetes is being managed with diet, activity and medications    Taking diabetes medications:   yes:     Diabetes Medication(s)     Insulin       insulin aspart (NOVOLOG FLEXPEN) 100 UNIT/ML pen    Take 4 units with breakfast, 8 units with lunch, and up to 11 units at dinner.  Max TDD= 25 units with prime          Estimated Date of Delivery: Mar 7, 2021    Blood Glucose/Ketone Lo- same biscuit with peanut butter with milk     Assessment:  Patient has continued to have elevated fasting readings even with in target post meal readings, as well as an in target reading with elevated post dinner. Has been taking 13 units at dinner.     Snack last night: [place] buiscuit and milk   - no bedtime snack  Recommend adding in 0.1 units Humulin/Novolin N insulin to address fasting blood sugar elevations.      Will try same snack for next 3 nights to see if in target fasting readings are repeatable.     Ketones:   Fasting blood glucoses: 50% in target.  After breakfast: 83% in target.  After lunch: 60% in target.  Before dinner: 100% in target.  After dinner: 50% in target.    Plan/Response:  Recommend increase to Novolog insulin: 13 -->4-8-15    Patient will continue to have PB granola bar with milk.  Discussed trying fairlife milk as an option.     CDE to send Send video link for vial and syring injection technique.  If elevations continue recommend starting 0.1 unit(s)/kg NPH insulin at bedtime.    Patient to send in on Friday to assess need for NPH insulin.  Will have growth ultrasound this thrusday.     Flory Gutierrez MS, RD, LD, CDE  Time Spent: 11 minutes    Any diabetes medication dose changes were made via the CDE Protocol and Collaborative  Practice Agreement with the patient's OB/GYN provider. A copy of this encounter was shared with the provider.

## 2021-01-27 DIAGNOSIS — O24.419 GDM (GESTATIONAL DIABETES MELLITUS): ICD-10-CM

## 2021-01-27 RX ORDER — LANCETS
EACH MISCELLANEOUS
Qty: 102 EACH | Refills: 3 | Status: SHIPPED | OUTPATIENT
Start: 2021-01-27 | End: 2021-04-01

## 2021-01-27 RX ORDER — BLOOD SUGAR DIAGNOSTIC
STRIP MISCELLANEOUS
Qty: 150 STRIP | Refills: 3 | Status: SHIPPED | OUTPATIENT
Start: 2021-01-27 | End: 2021-04-01

## 2021-01-27 NOTE — TELEPHONE ENCOUNTER
Pharmacy sent refill request for lancets for pt's glucose supplies.  Pt is gestational diabetic.  Will send refill today.  Bren Ahuja RN

## 2021-01-27 NOTE — TELEPHONE ENCOUNTER
Pharmacy sent refill request for test strips. Pt is GDM. Refill sent to pharmacy.   Paz Jessica RN-BSN

## 2021-01-28 ENCOUNTER — HOSPITAL ENCOUNTER (OUTPATIENT)
Dept: ULTRASOUND IMAGING | Facility: CLINIC | Age: 36
End: 2021-01-28
Attending: OBSTETRICS & GYNECOLOGY
Payer: COMMERCIAL

## 2021-01-28 ENCOUNTER — OFFICE VISIT (OUTPATIENT)
Dept: MATERNAL FETAL MEDICINE | Facility: CLINIC | Age: 36
End: 2021-01-28
Attending: OBSTETRICS & GYNECOLOGY
Payer: COMMERCIAL

## 2021-01-28 DIAGNOSIS — O24.414 INSULIN CONTROLLED GESTATIONAL DIABETES MELLITUS (GDM) IN THIRD TRIMESTER: Primary | ICD-10-CM

## 2021-01-28 DIAGNOSIS — O24.414 INSULIN CONTROLLED GESTATIONAL DIABETES MELLITUS (GDM) IN THIRD TRIMESTER: ICD-10-CM

## 2021-01-28 PROCEDURE — 76819 FETAL BIOPHYS PROFIL W/O NST: CPT

## 2021-01-28 PROCEDURE — 76819 FETAL BIOPHYS PROFIL W/O NST: CPT | Mod: 26 | Performed by: OBSTETRICS & GYNECOLOGY

## 2021-01-28 PROCEDURE — 76816 OB US FOLLOW-UP PER FETUS: CPT | Mod: 26 | Performed by: OBSTETRICS & GYNECOLOGY

## 2021-01-28 PROCEDURE — 76816 OB US FOLLOW-UP PER FETUS: CPT

## 2021-01-28 NOTE — PROGRESS NOTES
"Please see \"Imaging\" tab under \"Chart Review\" for details of today's US.    Shandra Cooper, DO    "

## 2021-01-29 ENCOUNTER — TELEPHONE (OUTPATIENT)
Dept: EDUCATION SERVICES | Facility: CLINIC | Age: 36
End: 2021-01-29

## 2021-01-29 DIAGNOSIS — O24.414 INSULIN CONTROLLED GESTATIONAL DIABETES MELLITUS (GDM) IN SECOND TRIMESTER: ICD-10-CM

## 2021-01-29 RX ORDER — INSULIN ASPART 100 [IU]/ML
INJECTION, SOLUTION INTRAVENOUS; SUBCUTANEOUS
Qty: 15 ML | Refills: 3 | COMMUNITY
Start: 2021-01-29 | End: 2021-02-02

## 2021-01-29 NOTE — TELEPHONE ENCOUNTER
Gestational Diabetes Follow-up    Subjective/Objective:    Ella Palacios sent in blood glucose log for review. Last date of communication was: 2021.    Gestational diabetes is being managed with diet, activity and medications    Taking diabetes medications:   yes:     Diabetes Medication(s)     Insulin       insulin aspart (NOVOLOG FLEXPEN) 100 UNIT/ML pen    Take 4 units with breakfast, 8 units with lunch, and up to 15 units at dinner.  Max TDD= 35 units with prime          Estimated Date of Delivery: Mar 7, 2021,  scheduled 2021    BG/Food Log:           Assessment:    Ketones: na.   Fasting blood glucoses: 50% in target.  After breakfast: 100% in target. Only 2 readings  Before lunch: x% in target.  After lunch: 66% in target.  Before dinner: x% in target.  After dinner: 50% in target.    Plan/Response:    Ella, so close with your numbers and birth coming soon.  Could you please check and see if you are eating the carbs and proteins you and baby need and the correct amount with meals per the meal plan below.  Also on Monday if you could send in your numbers again with food records so we can see if we can make changes with foods.  But we still may need bedtime NPH insulin, will decide on Monday.  You are 50 % at this time.  Will have you increase your Novolog as below.    Meal Plan Recommendation: 2 carbs at breakfast, 3 carbs at lunch, 3 carbs at supper, 2 carbs at each of 3 snacks between meals  Be sure to have proteins with all meals and snacks  Keep a food record for the next follow-up.  Recommend increase to insulin - Novolog 4-8-15-0 +> 4-9-16-0.    Keep up the good work and will make the decisions on Monday on how you are doing.  If you could please check all of your BG per the book, it will really help us to assess your BG more accurately and help you and baby to be safe.    Take care and thank you    Corine Burnham RN/TILA  [place] [facility] Educator      Any diabetes medication  dose changes were made via the CDE Protocol and Collaborative Practice Agreement with the patient's primary care provider and OB/GYN provider. A copy of this encounter was shared with the provider.

## 2021-02-01 ENCOUNTER — HOSPITAL ENCOUNTER (OUTPATIENT)
Dept: ULTRASOUND IMAGING | Facility: CLINIC | Age: 36
End: 2021-02-01
Attending: OBSTETRICS & GYNECOLOGY
Payer: COMMERCIAL

## 2021-02-01 ENCOUNTER — OFFICE VISIT (OUTPATIENT)
Dept: MATERNAL FETAL MEDICINE | Facility: CLINIC | Age: 36
End: 2021-02-01
Attending: OBSTETRICS & GYNECOLOGY
Payer: COMMERCIAL

## 2021-02-01 DIAGNOSIS — O26.90 PREGNANCY RELATED CONDITION, ANTEPARTUM: Primary | ICD-10-CM

## 2021-02-01 DIAGNOSIS — O24.414 INSULIN CONTROLLED GESTATIONAL DIABETES MELLITUS (GDM) IN THIRD TRIMESTER: ICD-10-CM

## 2021-02-01 PROCEDURE — 76819 FETAL BIOPHYS PROFIL W/O NST: CPT

## 2021-02-01 PROCEDURE — 76819 FETAL BIOPHYS PROFIL W/O NST: CPT | Mod: 26 | Performed by: OBSTETRICS & GYNECOLOGY

## 2021-02-02 ENCOUNTER — VIRTUAL VISIT (OUTPATIENT)
Dept: EDUCATION SERVICES | Facility: CLINIC | Age: 36
End: 2021-02-02
Payer: COMMERCIAL

## 2021-02-02 DIAGNOSIS — O24.414 INSULIN CONTROLLED GESTATIONAL DIABETES MELLITUS (GDM) IN SECOND TRIMESTER: Primary | ICD-10-CM

## 2021-02-02 DIAGNOSIS — O24.414 INSULIN CONTROLLED GESTATIONAL DIABETES MELLITUS (GDM) IN SECOND TRIMESTER: ICD-10-CM

## 2021-02-02 PROCEDURE — 98966 PH1 ASSMT&MGMT NQHP 5-10: CPT | Mod: TEL

## 2021-02-02 RX ORDER — INSULIN ASPART 100 [IU]/ML
INJECTION, SOLUTION INTRAVENOUS; SUBCUTANEOUS
Qty: 15 ML | Refills: 3 | Status: ON HOLD | OUTPATIENT
Start: 2021-02-02 | End: 2021-02-20

## 2021-02-02 NOTE — PROGRESS NOTES
Gestational Diabetes Follow-up  Type of Service: Telephone Visit    How would patient like to obtain AVS? Mail a copy    Subjective/Objective:    Ella Palacios was called for a scheduled BG review. Last date of communication was: 21.    Gestational diabetes is being managed with diet, activity and medications    Taking diabetes medications:   yes:     Diabetes Medication(s)     Insulin       insulin aspart (NOVOLOG FLEXPEN) 100 UNIT/ML pen    Take 4 units with breakfast, 9 units with lunch, and up to 16 units at dinner.  Max TDD= 35 units with prime          Estimated Date of Delivery: Mar 7, 2021    Blood Glucose/Ketone Lo/2: 105 fasting, after breakfast 122  Assessment:  Fasting blood sugars remain elevated despite in target post dinner readings.  Recommend starting 0.1 unit(s)/kg NPH insulin at bedtime, 0-0-0-6 units.  Reviewed formulary and PA is required for pens.  recommend ordering vial and syringe for injection.     Wt Readings from Last 3 Encounters:   21 66 kg (145 lb 6.4 oz)   20 64 kg (141 lb)   20 63.6 kg (140 lb 4.8 oz)     Ketones: none reported.   Fasting blood glucoses: 25% in target.  After breakfast: 100% in target.  After lunch: 43% in target.  After dinner: 83% in target.    Plan/Response:  Recommend that patient begin NPH insulin 0-0-0-6.  Recommend increase to insulin - Novolog 4-9-16-0 --> 4-11-16-0.  On the first day of NPH recommend taking 3 units at breakfast to prevent potential post breakfast low, if above 120, then go back to 4 units the next day.     Explained vial and syringe technique to patient and sent video link.     Follow up scheduled 1 week.    Orders for NPH routed to OBGYN provider.    Flory Gutierrez MS, RD, LD, CDE  Total time: 6 minutes     Any diabetes medication dose changes were made via the CDE Protocol and Collaborative Practice Agreement with the patient's OB/GYN provider. A copy of this encounter was shared with the provider.

## 2021-02-04 ENCOUNTER — OFFICE VISIT (OUTPATIENT)
Dept: MATERNAL FETAL MEDICINE | Facility: CLINIC | Age: 36
End: 2021-02-04
Attending: OBSTETRICS & GYNECOLOGY
Payer: COMMERCIAL

## 2021-02-04 ENCOUNTER — HOSPITAL ENCOUNTER (OUTPATIENT)
Dept: ULTRASOUND IMAGING | Facility: CLINIC | Age: 36
End: 2021-02-04
Attending: OBSTETRICS & GYNECOLOGY
Payer: COMMERCIAL

## 2021-02-04 DIAGNOSIS — O24.414 INSULIN CONTROLLED GESTATIONAL DIABETES MELLITUS (GDM) IN THIRD TRIMESTER: Primary | ICD-10-CM

## 2021-02-04 DIAGNOSIS — O24.414 INSULIN CONTROLLED GESTATIONAL DIABETES MELLITUS (GDM) IN THIRD TRIMESTER: ICD-10-CM

## 2021-02-04 PROCEDURE — 76819 FETAL BIOPHYS PROFIL W/O NST: CPT

## 2021-02-04 PROCEDURE — 76819 FETAL BIOPHYS PROFIL W/O NST: CPT | Mod: 26 | Performed by: OBSTETRICS & GYNECOLOGY

## 2021-02-04 NOTE — PROGRESS NOTES
The patient was seen for an ultrasound in the Maternal-Fetal Medicine Center at the [facility] [place] today.  For a detailed report of the ultrasound examination, please see the ultrasound report which can be found under the imaging tab.    Evelyne Bolden MD  , OB/GYN  Maternal-Fetal Medicine  [phone] (Pager)

## 2021-02-08 ENCOUNTER — HOSPITAL ENCOUNTER (OUTPATIENT)
Dept: ULTRASOUND IMAGING | Facility: CLINIC | Age: 36
End: 2021-02-08
Attending: OBSTETRICS & GYNECOLOGY
Payer: COMMERCIAL

## 2021-02-08 ENCOUNTER — TELEPHONE (OUTPATIENT)
Dept: EDUCATION SERVICES | Facility: CLINIC | Age: 36
End: 2021-02-08

## 2021-02-08 ENCOUNTER — OFFICE VISIT (OUTPATIENT)
Dept: MATERNAL FETAL MEDICINE | Facility: CLINIC | Age: 36
End: 2021-02-08
Attending: OBSTETRICS & GYNECOLOGY
Payer: COMMERCIAL

## 2021-02-08 DIAGNOSIS — O24.414 INSULIN CONTROLLED GESTATIONAL DIABETES MELLITUS (GDM) IN THIRD TRIMESTER: ICD-10-CM

## 2021-02-08 DIAGNOSIS — O24.414 INSULIN CONTROLLED GESTATIONAL DIABETES MELLITUS (GDM) IN THIRD TRIMESTER: Primary | ICD-10-CM

## 2021-02-08 PROCEDURE — 76819 FETAL BIOPHYS PROFIL W/O NST: CPT | Mod: 26 | Performed by: OBSTETRICS & GYNECOLOGY

## 2021-02-08 PROCEDURE — 76819 FETAL BIOPHYS PROFIL W/O NST: CPT

## 2021-02-08 NOTE — TELEPHONE ENCOUNTER
Ella sent the most recent records from the weekend as requested. It looks like BG's are mainly in range. I do see that she has a virtual appt tomorrow, so I will write her back and let her know the records will be reviewed tomorrow during her virtual appt.    I will leave her most current records in the diabetes educators email.    Krupa Coon RN, [facility]

## 2021-02-08 NOTE — TELEPHONE ENCOUNTER
Gestational Diabetes Follow-up    Subjective/Objective:    Ella Palacios sent in blood glucose log for review. Last date of communication was: 2/2/21.    Gestational diabetes is being managed with diet, activity and medications    Taking diabetes medications:   yes:     Diabetes Medication(s)     Insulin       insulin aspart (NOVOLOG FLEXPEN) 100 UNIT/ML pen    Take 4 units with breakfast, 11 units with lunch, and up to 16 units at dinner.  Max TDD= 35 units with prime     insulin  UNIT/ML vial    Inject 6 Units Subcutaneous At Bedtime          Estimated Date of Delivery: Mar 7, 2021    BG/Food Log:             Assessment:  Limited data to evaluate insulin effectiveness.  Request patient send updated BG's from the last 3 days.     Plan/Response:  J Carlos Jaramillo,    Thank you for sending your blood sugars. I would like to see the results from the weekend and this morning before we make adjustments to your dose.  Can you send an updated picture of your blood sugar log?    Thank you,     Olamide Askew RN, [facility]     Any diabetes medication dose changes were made via the CDE Protocol and Collaborative Practice Agreement with the patient's referring provider. A copy of this encounter was shared with the provider.

## 2021-02-09 ENCOUNTER — VIRTUAL VISIT (OUTPATIENT)
Dept: EDUCATION SERVICES | Facility: CLINIC | Age: 36
End: 2021-02-09
Payer: COMMERCIAL

## 2021-02-09 DIAGNOSIS — O24.414 INSULIN CONTROLLED GESTATIONAL DIABETES MELLITUS (GDM) IN SECOND TRIMESTER: Primary | ICD-10-CM

## 2021-02-09 NOTE — PROGRESS NOTES
Gestational Diabetes Follow-up  Type of Service: Telephone Visit    How would patient like to obtain AVS? [place]    Subjective/Objective:    Ella Palacios was called for a scheduled BG review. Last date of communication was: 21.    Gestational diabetes is being managed with diet, activity and medications    Taking diabetes medications:   yes:     Diabetes Medication(s)     Insulin       insulin aspart (NOVOLOG FLEXPEN) 100 UNIT/ML pen    Take 4 units with breakfast, 11 units with lunch, and up to 16 units at dinner.  Max TDD= 35 units with prime     insulin  UNIT/ML vial    Inject 6 Units Subcutaneous At Bedtime          Estimated Date of Delivery: Mar 7, 2021    Blood Glucose/Ketone Lo/9- fasting- 95, after breakfast     Assessment:  Recent fasting blood sugar is in target would recommend no change to NPH insulin dose.  If patient has another elevated fasting blood sugar then recommend 1 unit increase. Before [employer] took 12-13 units, recommended trying 15 units next time.     Ketones: negative.   Fasting blood glucoses: 57% in target.  After breakfast: 43% in target.  After lunch: 33% in target.  After dinner: 50% in target.    Plan/Response:  Recommend no change to insulin today.  If fasting 95 OR higher tomorrow go up to 0-0-0-7 units NPH, then ok to increase to 0-0-0-8 if fasting blood sugar remains elevated after 3 days.     Educated patient OK to increase mealtime Novolog 4 units if eating out or having a higher than usual load of carbohydrate for a meal. Discussed likely that meal was very high fat which caused blood sugars to stay high longer.    Flory Gutierrez MS, RD, LD, CDE  Time Spent: 7 minutes    Any diabetes medication dose changes were made via the CDE Protocol and Collaborative Practice Agreement with the patient's OB/GYN provider. A copy of this encounter was shared with the provider.

## 2021-02-11 ENCOUNTER — OFFICE VISIT (OUTPATIENT)
Dept: MATERNAL FETAL MEDICINE | Facility: CLINIC | Age: 36
End: 2021-02-11
Attending: OBSTETRICS & GYNECOLOGY
Payer: COMMERCIAL

## 2021-02-11 ENCOUNTER — HOSPITAL ENCOUNTER (OUTPATIENT)
Dept: ULTRASOUND IMAGING | Facility: CLINIC | Age: 36
End: 2021-02-11
Attending: OBSTETRICS & GYNECOLOGY
Payer: COMMERCIAL

## 2021-02-11 ENCOUNTER — PRENATAL OFFICE VISIT (OUTPATIENT)
Dept: OBGYN | Facility: CLINIC | Age: 36
End: 2021-02-11
Payer: COMMERCIAL

## 2021-02-11 VITALS
DIASTOLIC BLOOD PRESSURE: 64 MMHG | OXYGEN SATURATION: 97 % | WEIGHT: 147.5 LBS | SYSTOLIC BLOOD PRESSURE: 98 MMHG | HEART RATE: 86 BPM | HEIGHT: 63 IN | BODY MASS INDEX: 26.13 KG/M2

## 2021-02-11 DIAGNOSIS — Z34.80 SUPERVISION OF OTHER NORMAL PREGNANCY, ANTEPARTUM: Primary | ICD-10-CM

## 2021-02-11 DIAGNOSIS — O24.414 INSULIN CONTROLLED GESTATIONAL DIABETES MELLITUS (GDM) IN THIRD TRIMESTER: ICD-10-CM

## 2021-02-11 DIAGNOSIS — O24.414 INSULIN CONTROLLED GESTATIONAL DIABETES MELLITUS (GDM) IN THIRD TRIMESTER: Primary | ICD-10-CM

## 2021-02-11 PROCEDURE — 99207 PR PRENATAL VISIT: CPT | Performed by: OBSTETRICS & GYNECOLOGY

## 2021-02-11 PROCEDURE — 87653 STREP B DNA AMP PROBE: CPT | Performed by: OBSTETRICS & GYNECOLOGY

## 2021-02-11 PROCEDURE — 76819 FETAL BIOPHYS PROFIL W/O NST: CPT

## 2021-02-11 PROCEDURE — 76819 FETAL BIOPHYS PROFIL W/O NST: CPT | Mod: 26 | Performed by: OBSTETRICS & GYNECOLOGY

## 2021-02-11 RX ORDER — ACETAMINOPHEN 325 MG/1
650 TABLET ORAL EVERY 6 HOURS PRN
Qty: 100 TABLET | Refills: 0 | Status: SHIPPED | OUTPATIENT
Start: 2021-02-11 | End: 2021-07-26

## 2021-02-11 RX ORDER — BREAST PUMP
EACH MISCELLANEOUS
Qty: 1 EACH | Refills: 0 | Status: SHIPPED | OUTPATIENT
Start: 2021-02-11 | End: 2021-07-26

## 2021-02-11 RX ORDER — IBUPROFEN 600 MG/1
600 TABLET, FILM COATED ORAL EVERY 6 HOURS PRN
Qty: 60 TABLET | Refills: 0 | Status: SHIPPED | OUTPATIENT
Start: 2021-02-11 | End: 2021-04-01

## 2021-02-11 RX ORDER — AMOXICILLIN 250 MG
1 CAPSULE ORAL DAILY
Qty: 100 TABLET | Refills: 0 | Status: SHIPPED | OUTPATIENT
Start: 2021-02-11 | End: 2021-04-01

## 2021-02-11 ASSESSMENT — PATIENT HEALTH QUESTIONNAIRE - PHQ9: SUM OF ALL RESPONSES TO PHQ QUESTIONS 1-9: 6

## 2021-02-11 ASSESSMENT — MIFFLIN-ST. JEOR: SCORE: 1328.19

## 2021-02-11 NOTE — PROGRESS NOTES
Please see the imaging tab for details of the ultrasound performed today.    Judy Boyle MD  Specialist in Maternal-Fetal Medicine

## 2021-02-11 NOTE — PATIENT INSTRUCTIONS
IUD choices--Mirena (popular) good for 5 years with less bleeding or no cycles  ParaGard (no hormones) good for 10 years, cycles last day longer and heavier

## 2021-02-11 NOTE — PROGRESS NOTES
GBS today and BSUS confirms cephalic--had BPP today. Repeat c/s was discussed in detail including risk of bleeding, infection, damage to abdominal organs including bowel, bladder, blood vessels, nerves, and baby.   Recovery period/hosptial stay and restrictions discussed.  Pain medications after surgery were discussed.  All questions were answered. IUD types discussed and probably will do mirena.  Blood sugars are good on insulin 6 units at night. BE

## 2021-02-12 DIAGNOSIS — Z01.818 PRE-OP EXAM: Primary | ICD-10-CM

## 2021-02-12 DIAGNOSIS — Z98.891 H/O CESAREAN SECTION: ICD-10-CM

## 2021-02-12 LAB
GP B STREP DNA SPEC QL NAA+PROBE: NEGATIVE
SPECIMEN SOURCE: NORMAL

## 2021-02-16 ENCOUNTER — OFFICE VISIT (OUTPATIENT)
Dept: MATERNAL FETAL MEDICINE | Facility: CLINIC | Age: 36
End: 2021-02-16
Attending: OBSTETRICS & GYNECOLOGY
Payer: COMMERCIAL

## 2021-02-16 ENCOUNTER — TELEPHONE (OUTPATIENT)
Dept: EDUCATION SERVICES | Facility: CLINIC | Age: 36
End: 2021-02-16

## 2021-02-16 ENCOUNTER — HOSPITAL ENCOUNTER (OUTPATIENT)
Dept: ULTRASOUND IMAGING | Facility: CLINIC | Age: 36
End: 2021-02-16
Attending: OBSTETRICS & GYNECOLOGY
Payer: COMMERCIAL

## 2021-02-16 DIAGNOSIS — Z11.59 ENCOUNTER FOR SCREENING FOR OTHER VIRAL DISEASES: ICD-10-CM

## 2021-02-16 DIAGNOSIS — O24.414 INSULIN CONTROLLED GESTATIONAL DIABETES MELLITUS (GDM) IN THIRD TRIMESTER: Primary | ICD-10-CM

## 2021-02-16 DIAGNOSIS — O24.414 INSULIN CONTROLLED GESTATIONAL DIABETES MELLITUS (GDM) IN THIRD TRIMESTER: ICD-10-CM

## 2021-02-16 LAB
SARS-COV-2 RNA RESP QL NAA+PROBE: NORMAL
SPECIMEN SOURCE: NORMAL

## 2021-02-16 PROCEDURE — 76819 FETAL BIOPHYS PROFIL W/O NST: CPT

## 2021-02-16 PROCEDURE — 76819 FETAL BIOPHYS PROFIL W/O NST: CPT | Mod: 26 | Performed by: OBSTETRICS & GYNECOLOGY

## 2021-02-16 PROCEDURE — U0005 INFEC AGEN DETEC AMPLI PROBE: HCPCS | Performed by: OBSTETRICS & GYNECOLOGY

## 2021-02-16 PROCEDURE — U0003 INFECTIOUS AGENT DETECTION BY NUCLEIC ACID (DNA OR RNA); SEVERE ACUTE RESPIRATORY SYNDROME CORONAVIRUS 2 (SARS-COV-2) (CORONAVIRUS DISEASE [COVID-19]), AMPLIFIED PROBE TECHNIQUE, MAKING USE OF HIGH THROUGHPUT TECHNOLOGIES AS DESCRIBED BY CMS-2020-01-R: HCPCS | Performed by: OBSTETRICS & GYNECOLOGY

## 2021-02-16 NOTE — TELEPHONE ENCOUNTER
Gestational Diabetes Follow-up    Subjective/Objective:    Ella Palacios sent in blood glucose log for review. Last date of communication was: 2021.    Gestational diabetes is being managed with diet, activity and medications    Taking diabetes medications:   yes:     Diabetes Medication(s)     Insulin       insulin aspart (NOVOLOG FLEXPEN) 100 UNIT/ML pen    Take 4 units with breakfast, 11 units with lunch, and up to 16 units at dinner.  Max TDD= 35 units with prime     insulin  UNIT/ML vial    Inject 6 Units Subcutaneous At Bedtime          Estimated Date of Delivery: Mar 7, 2021    BG/Food Log:         Assessment:    Ketones: na.   Fasting blood glucoses: 100% in target.  After breakfast: 100% in target.  Before lunch: x% in target.  After lunch: 100% in target.  Before dinner: x% in target.  After dinner: 71% in target.    Plan/Response:  No changes in the patient's current treatment plan.    Ella, thank you for your numbers, you have done a great job.  And in 3 days you will see your little one, how exciting.  I would recommend you continue with the insulin doses for now until delivery and then your Dr. Will give you instructions for after delivery.  I will send a message to see if she wants you to take the 6 units of NPH at bedtime the night before your  or cut it in half.  Will let you know, unless she has instructed you already.    After Delivery:    Check blood sugar 4 - 6 times per week to be sure that the numbers have gone back to normal after baby is born. Check blood sugar before breakfast or 2 hours after the start of a meal.     Blood sugar goals are different when you are not pregnant:    Before breakfast: Less than 100  2 hours after a meal: Less than 140    If you have elevated numbers, contact your OB/GYN or primary care provider.    Have a 2-hour glucose tolerance test done at your post-partum check-up.     Take care and best of luck, enjoy your time.          Corine  Chacha RN/RENEEE  [place] Diabetes Educator      Any diabetes medication dose changes were made via the CDE Protocol and Collaborative Practice Agreement with the patient's primary care provider and OB/GYN provider. A copy of this encounter was shared with the provider.

## 2021-02-16 NOTE — TELEPHONE ENCOUNTER
It's such a small amount she can take the full dose night prior to her c/s.    Thanks  Isabel Chaparro MD

## 2021-02-16 NOTE — TELEPHONE ENCOUNTER
Dr. Chaparro Ella's numbers look great.  I have a question about her NPH the evening before, she has been on 6 units at bedtime, do you want her to continue with that dose or have her cut it in half.  Please advise so we can let her know    Thank you    Corine Burnham RN/TILA  [place] Diabetes Educator

## 2021-02-17 LAB
LABORATORY COMMENT REPORT: NORMAL
SARS-COV-2 RNA RESP QL NAA+PROBE: NEGATIVE
SPECIMEN SOURCE: NORMAL

## 2021-02-18 ENCOUNTER — ANESTHESIA EVENT (OUTPATIENT)
Dept: OBGYN | Facility: CLINIC | Age: 36
End: 2021-02-18
Payer: COMMERCIAL

## 2021-02-19 ENCOUNTER — HOSPITAL ENCOUNTER (INPATIENT)
Facility: CLINIC | Age: 36
LOS: 2 days | Discharge: HOME | End: 2021-02-21
Attending: OBSTETRICS & GYNECOLOGY | Admitting: OBSTETRICS & GYNECOLOGY
Payer: COMMERCIAL

## 2021-02-19 ENCOUNTER — ANESTHESIA (OUTPATIENT)
Dept: OBGYN | Facility: CLINIC | Age: 36
End: 2021-02-19
Payer: COMMERCIAL

## 2021-02-19 DIAGNOSIS — Z98.891 S/P CESAREAN SECTION: Primary | ICD-10-CM

## 2021-02-19 DIAGNOSIS — Z34.80 SUPERVISION OF OTHER NORMAL PREGNANCY, ANTEPARTUM: ICD-10-CM

## 2021-02-19 LAB
GLUCOSE BLDC GLUCOMTR-MCNC: 101 MG/DL (ref 70–99)
GLUCOSE BLDC GLUCOMTR-MCNC: 110 MG/DL (ref 70–99)
GLUCOSE BLDC GLUCOMTR-MCNC: 77 MG/DL (ref 70–99)
GLUCOSE BLDC GLUCOMTR-MCNC: 77 MG/DL (ref 70–99)

## 2021-02-19 PROCEDURE — 59514 CESAREAN DELIVERY ONLY: CPT | Mod: 80 | Performed by: OBSTETRICS & GYNECOLOGY

## 2021-02-19 PROCEDURE — 250N000011 HC RX IP 250 OP 636: Performed by: STUDENT IN AN ORGANIZED HEALTH CARE EDUCATION/TRAINING PROGRAM

## 2021-02-19 PROCEDURE — 360N000076 HC SURGERY LEVEL 3, PER MIN: Performed by: OBSTETRICS & GYNECOLOGY

## 2021-02-19 PROCEDURE — 120N000002 HC R&B MED SURG/OB UMMC

## 2021-02-19 PROCEDURE — C9290 INJ, BUPIVACAINE LIPOSOME: HCPCS

## 2021-02-19 PROCEDURE — 710N000010 HC RECOVERY PHASE 1, LEVEL 2, PER MIN: Performed by: OBSTETRICS & GYNECOLOGY

## 2021-02-19 PROCEDURE — 59510 CESAREAN DELIVERY: CPT | Mod: GC | Performed by: OBSTETRICS & GYNECOLOGY

## 2021-02-19 PROCEDURE — 999N001017 HC STATISTIC GLUCOSE BY METER IP

## 2021-02-19 PROCEDURE — 271N000001 HC OR GENERAL SUPPLY NON-STERILE: Performed by: OBSTETRICS & GYNECOLOGY

## 2021-02-19 PROCEDURE — 250N000013 HC RX MED GY IP 250 OP 250 PS 637: Performed by: STUDENT IN AN ORGANIZED HEALTH CARE EDUCATION/TRAINING PROGRAM

## 2021-02-19 PROCEDURE — 999N000141 HC STATISTIC PRE-PROCEDURE NURSING ASSESSMENT: Performed by: OBSTETRICS & GYNECOLOGY

## 2021-02-19 PROCEDURE — 258N000003 HC RX IP 258 OP 636: Performed by: OBSTETRICS & GYNECOLOGY

## 2021-02-19 PROCEDURE — 250N000011 HC RX IP 250 OP 636

## 2021-02-19 PROCEDURE — 250N000009 HC RX 250

## 2021-02-19 PROCEDURE — 250N000011 HC RX IP 250 OP 636: Performed by: ANESTHESIOLOGY

## 2021-02-19 PROCEDURE — 250N000009 HC RX 250: Performed by: STUDENT IN AN ORGANIZED HEALTH CARE EDUCATION/TRAINING PROGRAM

## 2021-02-19 PROCEDURE — 370N000017 HC ANESTHESIA TECHNICAL FEE, PER MIN: Performed by: OBSTETRICS & GYNECOLOGY

## 2021-02-19 PROCEDURE — 250N000013 HC RX MED GY IP 250 OP 250 PS 637: Performed by: OBSTETRICS & GYNECOLOGY

## 2021-02-19 PROCEDURE — 250N000011 HC RX IP 250 OP 636: Performed by: OBSTETRICS & GYNECOLOGY

## 2021-02-19 PROCEDURE — 272N000001 HC OR GENERAL SUPPLY STERILE: Performed by: OBSTETRICS & GYNECOLOGY

## 2021-02-19 PROCEDURE — 258N000003 HC RX IP 258 OP 636

## 2021-02-19 RX ORDER — MODIFIED LANOLIN
OINTMENT (GRAM) TOPICAL
Status: DISCONTINUED | OUTPATIENT
Start: 2021-02-19 | End: 2021-02-21 | Stop reason: HOSPADM

## 2021-02-19 RX ORDER — NALOXONE HYDROCHLORIDE 0.4 MG/ML
0.2 INJECTION, SOLUTION INTRAMUSCULAR; INTRAVENOUS; SUBCUTANEOUS
Status: ACTIVE | OUTPATIENT
Start: 2021-02-19 | End: 2021-02-20

## 2021-02-19 RX ORDER — KETOROLAC TROMETHAMINE 30 MG/ML
30 INJECTION, SOLUTION INTRAMUSCULAR; INTRAVENOUS EVERY 6 HOURS
Status: COMPLETED | OUTPATIENT
Start: 2021-02-19 | End: 2021-02-20

## 2021-02-19 RX ORDER — MISOPROSTOL 200 UG/1
400 TABLET ORAL
Status: COMPLETED | OUTPATIENT
Start: 2021-02-19 | End: 2021-02-19

## 2021-02-19 RX ORDER — FENTANYL CITRATE 50 UG/ML
10 INJECTION, SOLUTION INTRAMUSCULAR; INTRAVENOUS ONCE
Status: DISCONTINUED | OUTPATIENT
Start: 2021-02-19 | End: 2021-02-21 | Stop reason: CLARIF

## 2021-02-19 RX ORDER — NALOXONE HYDROCHLORIDE 0.4 MG/ML
0.2 INJECTION, SOLUTION INTRAMUSCULAR; INTRAVENOUS; SUBCUTANEOUS
Status: DISCONTINUED | OUTPATIENT
Start: 2021-02-19 | End: 2021-02-21 | Stop reason: HOSPADM

## 2021-02-19 RX ORDER — SODIUM CHLORIDE, SODIUM LACTATE, POTASSIUM CHLORIDE, CALCIUM CHLORIDE 600; 310; 30; 20 MG/100ML; MG/100ML; MG/100ML; MG/100ML
INJECTION, SOLUTION INTRAVENOUS
Status: DISCONTINUED
Start: 2021-02-19 | End: 2021-02-19 | Stop reason: HOSPADM

## 2021-02-19 RX ORDER — NICOTINE POLACRILEX 4 MG
15-30 LOZENGE BUCCAL
Status: DISCONTINUED | OUTPATIENT
Start: 2021-02-19 | End: 2021-02-20

## 2021-02-19 RX ORDER — IBUPROFEN 800 MG/1
800 TABLET, FILM COATED ORAL EVERY 6 HOURS
Status: DISCONTINUED | OUTPATIENT
Start: 2021-02-20 | End: 2021-02-21 | Stop reason: HOSPADM

## 2021-02-19 RX ORDER — FENTANYL CITRATE 50 UG/ML
25-50 INJECTION, SOLUTION INTRAMUSCULAR; INTRAVENOUS
Status: DISCONTINUED | OUTPATIENT
Start: 2021-02-19 | End: 2021-02-19 | Stop reason: HOSPADM

## 2021-02-19 RX ORDER — ONDANSETRON 2 MG/ML
4 INJECTION INTRAMUSCULAR; INTRAVENOUS EVERY 6 HOURS PRN
Status: DISCONTINUED | OUTPATIENT
Start: 2021-02-19 | End: 2021-02-21 | Stop reason: HOSPADM

## 2021-02-19 RX ORDER — NALBUPHINE HYDROCHLORIDE 10 MG/ML
2.5-5 INJECTION, SOLUTION INTRAMUSCULAR; INTRAVENOUS; SUBCUTANEOUS EVERY 6 HOURS PRN
Status: DISCONTINUED | OUTPATIENT
Start: 2021-02-19 | End: 2021-02-21 | Stop reason: HOSPADM

## 2021-02-19 RX ORDER — DIPHENHYDRAMINE HYDROCHLORIDE 50 MG/ML
25 INJECTION INTRAMUSCULAR; INTRAVENOUS EVERY 6 HOURS PRN
Status: DISCONTINUED | OUTPATIENT
Start: 2021-02-19 | End: 2021-02-21 | Stop reason: HOSPADM

## 2021-02-19 RX ORDER — BUPIVACAINE HYDROCHLORIDE 7.5 MG/ML
INJECTION, SOLUTION INTRASPINAL PRN
Status: DISCONTINUED | OUTPATIENT
Start: 2021-02-19 | End: 2021-02-19

## 2021-02-19 RX ORDER — BISACODYL 10 MG
10 SUPPOSITORY, RECTAL RECTAL DAILY PRN
Status: DISCONTINUED | OUTPATIENT
Start: 2021-02-21 | End: 2021-02-21 | Stop reason: HOSPADM

## 2021-02-19 RX ORDER — PROCHLORPERAZINE 25 MG
25 SUPPOSITORY, RECTAL RECTAL EVERY 12 HOURS PRN
Status: DISCONTINUED | OUTPATIENT
Start: 2021-02-19 | End: 2021-02-21 | Stop reason: HOSPADM

## 2021-02-19 RX ORDER — ACETAMINOPHEN 325 MG/1
975 TABLET ORAL EVERY 6 HOURS
Status: DISCONTINUED | OUTPATIENT
Start: 2021-02-19 | End: 2021-02-21 | Stop reason: HOSPADM

## 2021-02-19 RX ORDER — CEFAZOLIN SODIUM 2 G/100ML
2 INJECTION, SOLUTION INTRAVENOUS
Status: COMPLETED | OUTPATIENT
Start: 2021-02-19 | End: 2021-02-19

## 2021-02-19 RX ORDER — NALOXONE HYDROCHLORIDE 0.4 MG/ML
0.4 INJECTION, SOLUTION INTRAMUSCULAR; INTRAVENOUS; SUBCUTANEOUS
Status: DISCONTINUED | OUTPATIENT
Start: 2021-02-19 | End: 2021-02-21 | Stop reason: HOSPADM

## 2021-02-19 RX ORDER — ACETAMINOPHEN 325 MG/1
975 TABLET ORAL ONCE
Status: COMPLETED | OUTPATIENT
Start: 2021-02-19 | End: 2021-02-19

## 2021-02-19 RX ORDER — ONDANSETRON 2 MG/ML
4 INJECTION INTRAMUSCULAR; INTRAVENOUS EVERY 30 MIN PRN
Status: DISCONTINUED | OUTPATIENT
Start: 2021-02-19 | End: 2021-02-19 | Stop reason: HOSPADM

## 2021-02-19 RX ORDER — NALOXONE HYDROCHLORIDE 0.4 MG/ML
0.4 INJECTION, SOLUTION INTRAMUSCULAR; INTRAVENOUS; SUBCUTANEOUS
Status: ACTIVE | OUTPATIENT
Start: 2021-02-19 | End: 2021-02-20

## 2021-02-19 RX ORDER — SODIUM CHLORIDE, SODIUM LACTATE, POTASSIUM CHLORIDE, CALCIUM CHLORIDE 600; 310; 30; 20 MG/100ML; MG/100ML; MG/100ML; MG/100ML
INJECTION, SOLUTION INTRAVENOUS CONTINUOUS
Status: DISCONTINUED | OUTPATIENT
Start: 2021-02-19 | End: 2021-02-19 | Stop reason: HOSPADM

## 2021-02-19 RX ORDER — DIPHENHYDRAMINE HCL 25 MG
25 CAPSULE ORAL EVERY 6 HOURS PRN
Status: DISCONTINUED | OUTPATIENT
Start: 2021-02-19 | End: 2021-02-21 | Stop reason: HOSPADM

## 2021-02-19 RX ORDER — NALOXONE HYDROCHLORIDE 0.4 MG/ML
0.4 INJECTION, SOLUTION INTRAMUSCULAR; INTRAVENOUS; SUBCUTANEOUS
Status: DISCONTINUED | OUTPATIENT
Start: 2021-02-19 | End: 2021-02-20

## 2021-02-19 RX ORDER — OXYTOCIN/0.9 % SODIUM CHLORIDE 30/500 ML
PLASTIC BAG, INJECTION (ML) INTRAVENOUS CONTINUOUS PRN
Status: DISCONTINUED | OUTPATIENT
Start: 2021-02-19 | End: 2021-02-19

## 2021-02-19 RX ORDER — MORPHINE SULFATE 1 MG/ML
INJECTION, SOLUTION EPIDURAL; INTRATHECAL; INTRAVENOUS PRN
Status: DISCONTINUED | OUTPATIENT
Start: 2021-02-19 | End: 2021-02-19

## 2021-02-19 RX ORDER — LIDOCAINE 40 MG/G
CREAM TOPICAL
Status: DISCONTINUED | OUTPATIENT
Start: 2021-02-19 | End: 2021-02-21 | Stop reason: HOSPADM

## 2021-02-19 RX ORDER — ONDANSETRON 2 MG/ML
INJECTION INTRAMUSCULAR; INTRAVENOUS PRN
Status: DISCONTINUED | OUTPATIENT
Start: 2021-02-19 | End: 2021-02-19

## 2021-02-19 RX ORDER — NALOXONE HYDROCHLORIDE 0.4 MG/ML
0.4 INJECTION, SOLUTION INTRAMUSCULAR; INTRAVENOUS; SUBCUTANEOUS
Status: DISCONTINUED | OUTPATIENT
Start: 2021-02-19 | End: 2021-02-19 | Stop reason: HOSPADM

## 2021-02-19 RX ORDER — OXYCODONE HYDROCHLORIDE 5 MG/1
5 TABLET ORAL EVERY 4 HOURS PRN
Status: DISCONTINUED | OUTPATIENT
Start: 2021-02-19 | End: 2021-02-21 | Stop reason: HOSPADM

## 2021-02-19 RX ORDER — SODIUM CHLORIDE, SODIUM LACTATE, POTASSIUM CHLORIDE, CALCIUM CHLORIDE 600; 310; 30; 20 MG/100ML; MG/100ML; MG/100ML; MG/100ML
INJECTION, SOLUTION INTRAVENOUS CONTINUOUS
Status: DISCONTINUED | OUTPATIENT
Start: 2021-02-19 | End: 2021-02-19

## 2021-02-19 RX ORDER — EPHEDRINE SULFATE 50 MG/ML
5 INJECTION, SOLUTION INTRAMUSCULAR; INTRAVENOUS; SUBCUTANEOUS
Status: DISCONTINUED | OUTPATIENT
Start: 2021-02-19 | End: 2021-02-21 | Stop reason: HOSPADM

## 2021-02-19 RX ORDER — AMOXICILLIN 250 MG
2 CAPSULE ORAL 2 TIMES DAILY
Status: DISCONTINUED | OUTPATIENT
Start: 2021-02-19 | End: 2021-02-21 | Stop reason: HOSPADM

## 2021-02-19 RX ORDER — OXYTOCIN/0.9 % SODIUM CHLORIDE 30/500 ML
340 PLASTIC BAG, INJECTION (ML) INTRAVENOUS CONTINUOUS PRN
Status: DISCONTINUED | OUTPATIENT
Start: 2021-02-19 | End: 2021-02-21 | Stop reason: HOSPADM

## 2021-02-19 RX ORDER — KETOROLAC TROMETHAMINE 30 MG/ML
INJECTION, SOLUTION INTRAMUSCULAR; INTRAVENOUS PRN
Status: DISCONTINUED | OUTPATIENT
Start: 2021-02-19 | End: 2021-02-19

## 2021-02-19 RX ORDER — AMOXICILLIN 250 MG
1 CAPSULE ORAL 2 TIMES DAILY
Status: DISCONTINUED | OUTPATIENT
Start: 2021-02-19 | End: 2021-02-21 | Stop reason: HOSPADM

## 2021-02-19 RX ORDER — OXYTOCIN/0.9 % SODIUM CHLORIDE 30/500 ML
100 PLASTIC BAG, INJECTION (ML) INTRAVENOUS CONTINUOUS
Status: DISCONTINUED | OUTPATIENT
Start: 2021-02-19 | End: 2021-02-21 | Stop reason: HOSPADM

## 2021-02-19 RX ORDER — SIMETHICONE 80 MG
80 TABLET,CHEWABLE ORAL 4 TIMES DAILY PRN
Status: DISCONTINUED | OUTPATIENT
Start: 2021-02-19 | End: 2021-02-21 | Stop reason: HOSPADM

## 2021-02-19 RX ORDER — BUPIVACAINE HYDROCHLORIDE 2.5 MG/ML
INJECTION, SOLUTION EPIDURAL; INFILTRATION; INTRACAUDAL PRN
Status: DISCONTINUED | OUTPATIENT
Start: 2021-02-19 | End: 2021-02-19

## 2021-02-19 RX ORDER — CARBOPROST TROMETHAMINE 250 UG/ML
250 INJECTION, SOLUTION INTRAMUSCULAR
Status: DISCONTINUED | OUTPATIENT
Start: 2021-02-19 | End: 2021-02-21 | Stop reason: HOSPADM

## 2021-02-19 RX ORDER — ONDANSETRON 4 MG/1
4 TABLET, ORALLY DISINTEGRATING ORAL EVERY 30 MIN PRN
Status: DISCONTINUED | OUTPATIENT
Start: 2021-02-19 | End: 2021-02-19 | Stop reason: HOSPADM

## 2021-02-19 RX ORDER — NALOXONE HYDROCHLORIDE 0.4 MG/ML
0.2 INJECTION, SOLUTION INTRAMUSCULAR; INTRAVENOUS; SUBCUTANEOUS
Status: DISCONTINUED | OUTPATIENT
Start: 2021-02-19 | End: 2021-02-19 | Stop reason: HOSPADM

## 2021-02-19 RX ORDER — FENTANYL CITRATE 50 UG/ML
INJECTION, SOLUTION INTRAMUSCULAR; INTRAVENOUS PRN
Status: DISCONTINUED | OUTPATIENT
Start: 2021-02-19 | End: 2021-02-19

## 2021-02-19 RX ORDER — MORPHINE SULFATE 1 MG/ML
150 INJECTION, SOLUTION EPIDURAL; INTRATHECAL; INTRAVENOUS ONCE
Status: DISCONTINUED | OUTPATIENT
Start: 2021-02-19 | End: 2021-02-21 | Stop reason: CLARIF

## 2021-02-19 RX ORDER — DEXTROSE MONOHYDRATE 25 G/50ML
25-50 INJECTION, SOLUTION INTRAVENOUS
Status: DISCONTINUED | OUTPATIENT
Start: 2021-02-19 | End: 2021-02-20

## 2021-02-19 RX ORDER — OXYTOCIN 10 [USP'U]/ML
10 INJECTION, SOLUTION INTRAMUSCULAR; INTRAVENOUS
Status: DISCONTINUED | OUTPATIENT
Start: 2021-02-19 | End: 2021-02-21 | Stop reason: HOSPADM

## 2021-02-19 RX ORDER — DEXTROSE, SODIUM CHLORIDE, SODIUM LACTATE, POTASSIUM CHLORIDE, AND CALCIUM CHLORIDE 5; .6; .31; .03; .02 G/100ML; G/100ML; G/100ML; G/100ML; G/100ML
INJECTION, SOLUTION INTRAVENOUS CONTINUOUS
Status: DISCONTINUED | OUTPATIENT
Start: 2021-02-19 | End: 2021-02-21 | Stop reason: HOSPADM

## 2021-02-19 RX ORDER — CEFAZOLIN SODIUM 1 G/3ML
1 INJECTION, POWDER, FOR SOLUTION INTRAMUSCULAR; INTRAVENOUS SEE ADMIN INSTRUCTIONS
Status: DISCONTINUED | OUTPATIENT
Start: 2021-02-19 | End: 2021-02-19

## 2021-02-19 RX ORDER — HYDROCORTISONE 2.5 %
CREAM (GRAM) TOPICAL 3 TIMES DAILY PRN
Status: DISCONTINUED | OUTPATIENT
Start: 2021-02-19 | End: 2021-02-21 | Stop reason: HOSPADM

## 2021-02-19 RX ORDER — EPHEDRINE SULFATE 50 MG/ML
INJECTION, SOLUTION INTRAMUSCULAR; INTRAVENOUS; SUBCUTANEOUS PRN
Status: DISCONTINUED | OUTPATIENT
Start: 2021-02-19 | End: 2021-02-19

## 2021-02-19 RX ORDER — METHYLERGONOVINE MALEATE 0.2 MG/ML
200 INJECTION INTRAVENOUS
Status: DISCONTINUED | OUTPATIENT
Start: 2021-02-19 | End: 2021-02-21 | Stop reason: HOSPADM

## 2021-02-19 RX ORDER — NALOXONE HYDROCHLORIDE 0.4 MG/ML
0.2 INJECTION, SOLUTION INTRAMUSCULAR; INTRAVENOUS; SUBCUTANEOUS
Status: DISCONTINUED | OUTPATIENT
Start: 2021-02-19 | End: 2021-02-20

## 2021-02-19 RX ORDER — CITRIC ACID/SODIUM CITRATE 334-500MG
30 SOLUTION, ORAL ORAL
Status: COMPLETED | OUTPATIENT
Start: 2021-02-19 | End: 2021-02-19

## 2021-02-19 RX ORDER — FENTANYL CITRATE-0.9 % NACL/PF 10 MCG/ML
PLASTIC BAG, INJECTION (ML) INTRAVENOUS CONTINUOUS PRN
Status: DISCONTINUED | OUTPATIENT
Start: 2021-02-19 | End: 2021-02-19

## 2021-02-19 RX ADMIN — Medication 2 G: at 08:32

## 2021-02-19 RX ADMIN — ACETAMINOPHEN 975 MG: 325 TABLET, FILM COATED ORAL at 16:33

## 2021-02-19 RX ADMIN — OXYTOCIN-SODIUM CHLORIDE 0.9% IV SOLN 30 UNIT/500ML 300 ML/HR: 30-0.9/5 SOLUTION at 09:04

## 2021-02-19 RX ADMIN — MISOPROSTOL 400 MCG: 200 TABLET ORAL at 11:00

## 2021-02-19 RX ADMIN — Medication 100 MCG/MIN: at 08:30

## 2021-02-19 RX ADMIN — KETOROLAC TROMETHAMINE 30 MG: 30 INJECTION, SOLUTION INTRAMUSCULAR; INTRAVENOUS at 23:31

## 2021-02-19 RX ADMIN — ACETAMINOPHEN 975 MG: 325 TABLET ORAL at 08:12

## 2021-02-19 RX ADMIN — PHENYLEPHRINE HYDROCHLORIDE 100 MCG: 10 INJECTION INTRAVENOUS at 09:11

## 2021-02-19 RX ADMIN — Medication 5 MG: at 09:42

## 2021-02-19 RX ADMIN — SODIUM CITRATE AND CITRIC ACID MONOHYDRATE 30 ML: 500; 334 SOLUTION ORAL at 08:13

## 2021-02-19 RX ADMIN — FENTANYL CITRATE 10 MCG: 50 INJECTION, SOLUTION INTRAMUSCULAR; INTRAVENOUS at 08:27

## 2021-02-19 RX ADMIN — SODIUM CHLORIDE, POTASSIUM CHLORIDE, SODIUM LACTATE AND CALCIUM CHLORIDE: 600; 310; 30; 20 INJECTION, SOLUTION INTRAVENOUS at 09:00

## 2021-02-19 RX ADMIN — PHENYLEPHRINE HYDROCHLORIDE 100 MCG: 10 INJECTION INTRAVENOUS at 09:21

## 2021-02-19 RX ADMIN — SODIUM CHLORIDE, POTASSIUM CHLORIDE, SODIUM LACTATE AND CALCIUM CHLORIDE: 600; 310; 30; 20 INJECTION, SOLUTION INTRAVENOUS at 08:20

## 2021-02-19 RX ADMIN — Medication 100 ML/HR: at 12:22

## 2021-02-19 RX ADMIN — DOCUSATE SODIUM AND SENNOSIDES 2 TABLET: 8.6; 5 TABLET ORAL at 20:02

## 2021-02-19 RX ADMIN — PHENYLEPHRINE HYDROCHLORIDE 100 MCG: 10 INJECTION INTRAVENOUS at 09:08

## 2021-02-19 RX ADMIN — PHENYLEPHRINE HYDROCHLORIDE 100 MCG: 10 INJECTION INTRAVENOUS at 08:49

## 2021-02-19 RX ADMIN — PHENYLEPHRINE HYDROCHLORIDE 100 MCG: 10 INJECTION INTRAVENOUS at 08:32

## 2021-02-19 RX ADMIN — BUPIVACAINE HYDROCHLORIDE IN DEXTROSE 1.6 ML: 7.5 INJECTION, SOLUTION SUBARACHNOID at 08:27

## 2021-02-19 RX ADMIN — BUPIVACAINE HYDROCHLORIDE 20 ML: 2.5 INJECTION, SOLUTION EPIDURAL; INFILTRATION; INTRACAUDAL at 09:57

## 2021-02-19 RX ADMIN — MORPHINE SULFATE 0.15 MG: 1 INJECTION EPIDURAL; INTRATHECAL; INTRAVENOUS at 08:27

## 2021-02-19 RX ADMIN — ACETAMINOPHEN 975 MG: 325 TABLET, FILM COATED ORAL at 23:31

## 2021-02-19 RX ADMIN — ONDANSETRON 4 MG: 2 INJECTION INTRAMUSCULAR; INTRAVENOUS at 08:35

## 2021-02-19 RX ADMIN — BUPIVACAINE 20 ML: 13.3 INJECTION, SUSPENSION, LIPOSOMAL INFILTRATION at 09:57

## 2021-02-19 RX ADMIN — KETOROLAC TROMETHAMINE 30 MG: 30 INJECTION, SOLUTION INTRAMUSCULAR at 09:31

## 2021-02-19 RX ADMIN — KETOROLAC TROMETHAMINE 30 MG: 30 INJECTION, SOLUTION INTRAMUSCULAR; INTRAVENOUS at 16:33

## 2021-02-19 RX ADMIN — BUPIVACAINE HYDROCHLORIDE IN DEXTROSE 1.4 ML: 7.5 INJECTION, SOLUTION SUBARACHNOID at 08:45

## 2021-02-19 NOTE — PROGRESS NOTES
Patient arrived to [facility] unit via zoom cart at 1320,with belongings, accompanied by spouse/ significant other, with infant in arms. Received report from LEONEL and checked bands. Unit and room orientation started. Call light given; no concerns present at this time. Continue with plan of care.

## 2021-02-19 NOTE — ANESTHESIA PROCEDURE NOTES
Spinal/LP Procedure Note  Spinal Block    Staff -   Anesthesiologist:  Yeni Martinez MD  Performed By: anesthesiologist  Location: OB  Procedure Start/Stop Times:      2/19/2021 8:43 AM     2/19/2021 8:47 AM    patient identified, IV checked, site marked, risks and benefits discussed, informed consent, monitors and equipment checked, pre-op evaluation, at physician/surgeon's request and post-op pain management      Correct Patient: Yes      Correct Position: Yes      Correct Site: Yes      Correct Procedure: Yes      Correct Laterality:  Yes    Site Marked:  Yes  Procedure:     Procedure:  Intrathecal    ASA:  2    Position:  Sitting    Sterile Prep: chloraprep      Insertion site:  L4-5    Approach:  Midline    Needle Type:  Pencan    Needle gauge (G):  25    Local Skin Infiltration:  2% lidocaine    amount (ml):  3    Needle Length (in):  3.5    Introducer used: Yes      Introducer gauge:  20 G    Attempts:  1    Redirects:  0    CSF:  Clear    Paresthesias:  No  Assessment/Narrative:     Sensory Level:  T4

## 2021-02-19 NOTE — PROGRESS NOTES
Patient here for scheduled  section.  Johny, , present with patient.  VSS.  BG WNL.  FHR category 1.  Occasional ctx that patient denies feeling.  Denies LOF or vaginal bleedling.  Pre-op care completed.  Patient transferred to OR1, delivery of baby girl at 0902.  Procedure closing in process.  Anticipate transfer to L&D PACU.

## 2021-02-19 NOTE — ANESTHESIA CARE TRANSFER NOTE
Patient: Ella Palacios    Procedure(s):   SECTION    Diagnosis: Supervision of other normal pregnancy, antepartum [Z34.80]  Diagnosis Additional Information: No value filed.    Anesthesia Type:   Spinal     Note:    Oropharynx: oropharynx clear of all foreign objects and spontaneously breathing  Level of Consciousness: awake  Oxygen Supplementation: room air    Independent Airway: airway patency satisfactory and stable  Dentition: dentition unchanged  Vital Signs Stable: post-procedure vital signs reviewed and stable  Report to RN Given: handoff report given  Patient transferred to: PACU  Comments: VSS. Breathing spontaneously at a regular rate with adequate tidal volumes and maintaining O2 sats on RA. Denies nausea or pain. No apparent complications from anesthesia.     Jatin Anaya MD  Anesthesia CA-1  [phone]    Handoff Report: Identifed the Patient, Identified the Reponsible Provider, Reviewed the pertinent medical history, Discussed the surgical course, Reviewed Intra-OP anesthesia mangement and issues during anesthesia, Set expectations for post-procedure period and Allowed opportunity for questions and acknowledgement of understanding      Vitals: (Last set prior to Anesthesia Care Transfer)  CRNA VITALS  2021 0929 - 2021 1008      2021             Pulse:  78    Ht Rate:  86    SpO2:  96 %        Electronically Signed By: Renata Anaya MD  2021  10:08 AM

## 2021-02-19 NOTE — ANESTHESIA PREPROCEDURE EVALUATION
Anesthesia Pre-Procedure Evaluation    Patient: Ella Palacios   MRN: [id number] : 1985        Preoperative Diagnosis: Supervision of other normal pregnancy, antepartum [Z34.80]   Procedure : Procedure(s):   SECTION     Past Medical History:   Diagnosis Date     Diabetes mellitus of mother, complicating pregnancy, childbirth, or the puerperium, unspecified as to episode of care(648.00) 2015    Gestational diabetes--glyburide      Past Surgical History:   Procedure Laterality Date     C BREAST AUGMENTATION  2017    silicone      SECTION  2012    Procedure:  SECTION;   section ;  Surgeon: Isabel Chaparro MD;  Location: [facility] L[facility]      SECTION N/A 2015    Procedure:  SECTION;  Surgeon: Isabel Chaparro MD;  Location: [facility] L[facility]     LAPAROSCOPY DIAGNOSTIC (GYN)  2011    Procedure:LAPAROSCOPY DIAGNOSTIC (GYN); Surgeon:ISABEL CHAPARRO; Location:[facility]     LAPAROTOMY EXPLORATORY  2011    cornual ectopic with wedge resection      No Known Allergies   Social History     Tobacco Use     Smoking status: Never Smoker     Smokeless tobacco: Never Used   Substance Use Topics     Alcohol use: No      Wt Readings from Last 1 Encounters:   21 66.9 kg (147 lb 8 oz)        Anesthesia Evaluation   Pt has had prior anesthetic. Type: General and Regional (Covid neg 21 ).    No history of anesthetic complications       ROS/MED HX  ENT/Pulmonary:  - neg pulmonary ROS     Neurologic:  - neg neurologic ROS     Cardiovascular:  - neg cardiovascular ROS     METS/Exercise Tolerance:     Hematologic:  - neg hematologic  ROS     Musculoskeletal:       GI/Hepatic:  - neg GI/hepatic ROS     Renal/Genitourinary:       Endo:     (+) type II DM, Using insulin, Normal glucose range: 70-90 per patient,     Psychiatric/Substance Use:  - neg psychiatric ROS     Infectious Disease:       Malignancy:       Other:      (+) , previous ,          Physical Exam    Airway        Mallampati: I   TM distance: > 3 FB   Neck ROM: full   Mouth opening: > 3 cm    Respiratory Devices and Support         Dental  no notable dental history         Cardiovascular   cardiovascular exam normal          Pulmonary   pulmonary exam normal                CBC RESULTS:   Recent Labs   Lab Test 02/16/21  1607 12/01/20  1406 07/28/20  1334 02/21/20  1030 02/03/17  1355   WBC 8.1  --  8.9 7.7 6.3   RBC 4.00  --  4.43 4.45 4.51   HGB 12.5 11.9 13.7 13.4 13.5   HCT 36.8  --  39.9 40.1 41.2   MCV 92  --  90 90 91   MCH 31.3  --  30.9 30.1 29.9   MCHC 34.0  --  34.3 33.4 32.8   RDW 13.2  --  13.1 13.0 13.3     --  269 268 273       OUTSIDE LABS:  CBC:   Lab Results   Component Value Date    WBC 8.1 02/16/2021    WBC 8.9 07/28/2020    HGB 12.5 02/16/2021    HGB 11.9 12/01/2020    HCT 36.8 02/16/2021    HCT 39.9 07/28/2020     02/16/2021     07/28/2020     BMP:   Lab Results   Component Value Date    GLC 86 02/21/2020    GLC 77 02/03/2017     COAGS: No results found for: PTT, INR, FIBR  POC:   Lab Results   Component Value Date    BGM 82 11/23/2015    HCG Negative 03/09/2012     HEPATIC: No results found for: ALBUMIN, PROTTOTAL, ALT, AST, GGT, ALKPHOS, BILITOTAL, BILIDIRECT, EPIFANIO  OTHER:   Lab Results   Component Value Date    A1C 5.3 07/28/2020    TSH 1.72 02/21/2020       Anesthesia Plan    ASA Status:  2   NPO Status:  ELEVATED Aspiration Risk/Unknown    Anesthesia Type: Spinal.   Induction: N/a.   Maintenance: N/A.        Consents    Anesthesia Plan(s) and associated risks, benefits, and realistic alternatives discussed. Questions answered and patient/representative(s) expressed understanding.     - Discussed with:  Patient    Use of blood products discussed: Yes.     - Discussed with: Patient.     Postoperative Care    Pain management: Oral pain medications, intrathecal morphine, Neuraxial analgesia, Peripheral nerve block (Single Shot), Multi-modal  analgesia.   PONV prophylaxis: Ondansetron (or other 5HT-3), Promethazine or metoclopramide     Comments:    21  SECTION (N/A Abdomen)  GDM 6 units NPH  Covid neg 21        neg OB ROS.       Renata Anaya MD     I have reviewed the chart with the resident.  I have examined the patient.  I agree with above exam, assessment, and plan as documented by the resident.    Yeni Martinez MD  +

## 2021-02-19 NOTE — H&P
History and Physical     Ella Palacios MRN# [id number]   YOB: 1985 Age: 35 year old      Date of Admission: 21       Assessment and Plan:       35 year old  at 37w5d by LMP c/w 10w1d US, here for scheduled repeat  delivery. Pregnancy is notable for a history of two prior  deliveries, right cornual wedge resection due to ectopic pregnancy, pregestational diabetes and AMA.       # Scheduled repeat  Section  Indicated due to history of cornual wedge resection.  Most recent op note noted minimal subcutaneous scarring with mild fascial thickening. Will plan for a Pfannenstiel skin incision with low transverse hysterotomy  - Labs: CBC, T&S, RPR   - Pre-op Hgb 12.5, Plts 198  - Placenta: Fundal  - Anesthesia: Spinal   - 2g Ancef   - PPH Meds/Ppx: No contraindications  - Diet: NPO  - PPx: SCDs  - Consent: Discussed risks and benefits of procedure, including but not limited to bleeding, infection, injury to surrounding organs, injury to infant, and the potential need for another surgery should some injury go unrecognized or patient were to have continued bleeding. Patient had time to ask questions and expressed understanding of procedure and associated risks. Agreed to blood transfusion if necessary. Consent signed.     # Pregestational Diabetes   Currently controlled on NPH 6 U at bedtime and mealtime aspart 16. Majority of sugars over the last week have been within goal. She follows with Endocrinology. TIFFANIE WNL  - Post-op regimen: MSSI  - Blood Glucose Checks: qAC/HS  - EFW 93%ile; AC 99%ile    # PNC  - Rh positive, Rubella equivicol, GBS negative  - Other prenatal labs wnl  - S/p flu, Tdap vaccines  - Contraception: mIUD  - Feeding: Breast     # FWB:   Cat 1 tracing, reactive; cephalic by recent BSUS; EFW 8 lbs  - NICU for delivery for type 2 DM    Patient discussed with Dr. Chaaprro.           HPI:     Ella Palacios is a 35 year old  at 37w5d who  presents today for scheduled repeat  section.    She reports good fetal movement. Denies LOF, vaginal bleeding, or contractions. No recent fever, chills, SOB, chest pain, palpitations, N/V, LE swelling/tenderness.  No concerns for headache, vision changes, RUQ pain.     Denies history of postpartum hemorrhage, pre-eclampsia. No history of asthma or high blood pressure.      OB History:    OB History    Para Term  AB Living   5 3 3 0 1 3   SAB TAB Ectopic Multiple Live Births   0 0 1 0 3      # Outcome Date GA Lbr Rakesh/2nd Weight Sex Delivery Anes PTL Lv   5 Current            4 Term 11/23/15 37w2d  3.459 kg (7 lb 10 oz) M CS-LTranv Spinal N LYN      Name: Joe      Apgar1: 8  Apgar5: 8   3 Term 12 37w3d  3.856 kg (8 lb 8 oz) M CS-LTranv Spinal N LYN      Birth Comments: c/s due to wedge resection      Name: Cristian      Apgar1: 8  Apgar5: 9   2 Ectopic 11              Birth Comments: cornual ectopic   1 Term  40w0d  3.685 kg (8 lb 2 oz) M Vag-Vacuum EPI  LYN      Birth Comments: chorio, thick mec      Name: Constantine        Prenatal Lab Results:  Lab Results   Component Value Date    ABO O 2021    RH Pos 2021    AS Neg 2021    HEPBANG Nonreactive 2020    CHPCRT Negative 08/10/2020    GCPCRT Negative 08/10/2020    TREPAB Negative 2015    RUBELLAABIGG 38 2012    HGB 12.5 2021    HIV Negative 2012       GBS Status:   Lab Results   Component Value Date    GBS Negative 2021                Past Medical History:     Past Medical History:   Diagnosis Date     Diabetes (H)     gestational dm     Diabetes mellitus of mother, complicating pregnancy, childbirth, or the puerperium, unspecified as to episode of care(648.00) 2015    Gestational diabetes--glyburide             Past Surgical History:     Past Surgical History:   Procedure Laterality Date     BREAST SURGERY       C BREAST AUGMENTATION  2017    silicone       SECTION  2012    Procedure:  SECTION;   section ;  Surgeon: Isabel Chaparro MD;  [facility]      SECTION N/A 2015    Procedure:  SECTION;  Surgeon: Isabel Chaparro MD;  [facility]     LAPAROSCOPY DIAGNOSTIC (GYN)  2011    Procedure:LAPAROSCOPY DIAGNOSTIC (GYN); Surgeon:ISABEL CHAPARRO; [facility]     LAPAROTOMY EXPLORATORY  2011    cornual ectopic with wedge resection             Social History:     Social History     Tobacco Use     Smoking status: Never Smoker     Smokeless tobacco: Never Used   Substance Use Topics     Alcohol use: No             Family History:     Family History   Problem Relation Age of Onset     C.A.D. Paternal Grandfather      Cerebrovascular Disease Paternal Grandfather      Breast Cancer Maternal Grandmother         in later 50's             Immunizations:     Immunization History   Administered Date(s) Administered     Influenza (IIV3) PF 2012     Influenza Vaccine IM > 6 months Valent IIV4 2015, 2020     Influenza Vaccine, 6+MO IM (QUADRIVALENT W/PRESERVATIVES) 2016     TDAP Vaccine (Boostrix) 2012, 2015     Tdap (Adacel,Boostrix) 2020            Allergies:   No Known Allergies          Medications:     Medications Prior to Admission   Medication Sig Dispense Refill Last Dose     acetaminophen (TYLENOL) 325 MG tablet Take 2 tablets (650 mg) by mouth every 6 hours as needed for mild pain Start after Delivery. 100 tablet 0 Past Month at Unknown time     insulin aspart (NOVOLOG FLEXPEN) 100 UNIT/ML pen Take 4 units with breakfast, 11 units with lunch, and up to 16 units at dinner.  Max TDD= 35 units with prime 15 mL 3 2021 at Unknown time     insulin  UNIT/ML vial Inject 6 Units Subcutaneous At Bedtime 10 mL 0 2021 at Unknown time     Prenatal MV-Min-Fe Fum-FA-DHA (PRENATAL+DHA PO)    2021 at Unknown time     ACCU-CHEK GUIDE test strip Use  "to test blood sugar 4 times daily or as directed. 150 strip 3      acetone urine (KETOSTIX) test strip 1 strip daily Use one strip daily for 1 week then weekly if negative. 50 each 3      blood glucose monitoring (ACCU-CHEK FASTCLIX) lancets Use to test blood sugar 4 times daily or as directed. 102 each 3      Blood Glucose Monitoring Suppl (ACCU-CHEK GUIDE ME) w/Device KIT 1 Device daily 1 kit 0      ibuprofen (ADVIL/MOTRIN) 600 MG tablet Take 1 tablet (600 mg) by mouth every 6 hours as needed for moderate pain Start after delivery 60 tablet 0 More than a month at Unknown time     Insulin Pen Needle (PEN NEEDLES) 32G X 4 MM MISC 1 each daily 100 each 1      insulin syringe-needle U-100 31G X 15/64\" 0.3 ML Use 1 syringes daily or as directed. 100 each 0      Misc. Devices (BREAST PUMP) MISC Double electric breast pump 1 each 0      senna-docusate (SENOKOT-S/PERICOLACE) 8.6-50 MG tablet Take 1 tablet by mouth daily Start after delivery. 100 tablet 0 Unknown at Unknown time             Review of Systems & Physical Exam:     The Review of Systems is negative other than noted in the HPI      /73   Temp 97.6  F (36.4  C) (Oral)   Resp 16   LMP 05/31/2020   Gen: NAD  CV: Regular rate  Lungs: non-labored breathing  Abd: Gravid, non-tender, non-distended  Ext: no peripheral extremity edema    Presentation: cephlaic by recent ultrasound.  Estimated Fetal Weight: 8 lbs    FHT:  Monitoring External  FHT: Baseline 130 bpm; moderate variability; accels present; no decelerations  TOCO 2-3 contractions in 10 minutes         Data:     BPP (2/16) IMPRESSION  ---------------------------------------------------------------------------------------------------------     Patient is here for antepartum testing secondary to GDM A2.     1) Intrauterine pregnancy at 37w2d gestational age.     2) The BPP (performed for maternal GDM A2) is reassuring.     3) The amniotic fluid volume appears normal.        Mary Lopez, " MD  Obstetrics & Gynecology, PGY-2  02/19/21 6:33 AM       Physician Attestation   I, SUNIL COOLEY MD, saw this patient with the resident and agree with the resident/fellow's findings and plan of care as documented in the note.      I personally reviewed vital signs, labs and fetal tracing.    Key findings: NST reactive, here for repeat c/s. Had wedge resection so scheduled 37+. Declines tubal ligation, plans mirena IUD. No questions and consent done.    SUNIL COOLEY MD  Date of Service (when I saw the patient): 02/19/21

## 2021-02-19 NOTE — PROVIDER NOTIFICATION
02/19/21 1300   Provider Notification   Provider Name/Title Dr. Martinez   Method of Notification Phone   Request Evaluate-Remote   Notification Reason Status Update     Patient's spinal level now at T10.  VSS.  Provider ok with patient transferring to [facility].

## 2021-02-19 NOTE — BRIEF OP NOTE
[facility]  Brief Operative Progress Note     Surgery Date:  2021    Surgeons:  MD Karli Sampson MD     Assistants:  Mary Lopez MD, PGY-2    Kevin Joyner MS3    Pre-op Diagnosis:    - Intrauterine pregnancy at 37w5d  - History of CS x 1, h/o right cornual wedge resection   - GDMA2  - AMA    Post-op Diagnosis:    - Same   - Liveborn female infant     Procedure:  Repeat low-transverse  section with double layer uterine closure via pfannenstiel incision    Anesthesia: Spinal    EBL:  824 mL     IVF:  1100 mL crystalloid    UOP:  300 mL clear urine at the end of the case    Drains: Canales Catheter     Specimens:  None    Complications: None apparent    Indications:   Ella Palacios is a 35 year old  at 37w5d admitted for scheduled repeat  delivery.  The risks, benefits, and alternatives of  section were discussed with the patient, and she agreed to proceed.     Findings:   1. Subcutaneous scarring. Fascial thickening, rectus diastasis. No adhesions to the uterus or omental adhesions to the anterior abdominal wall.   2. Clear amniotic fluid  3. Liveborn female infant in cephalic presentation. Apgars 9 at 1 minute & 9 at 5 minutes. Weight pending.  4. Surgically absent right fallopian tube and ovary. Otherwise normal uterus and left ovary.       Mary Lopez MD  Obstetrics & Gynecology, PGY-2  21 9:56 AM

## 2021-02-19 NOTE — PROVIDER NOTIFICATION
02/19/21 1413   Provider Notification   Provider Name/Title G3   Method of Notification Electronic Page   Request Evaluate-Remote   Notification Reason Other     Patient's total QBL with what has been weighed in OR and postop is 1045ml.  Just wanted you to know total is above 1L.

## 2021-02-19 NOTE — PLAN OF CARE
Data: Ella Palacios transferred to [facility] via wheelchair at 1305. Baby transferred via parent's arms.  Action: Receiving unit notified of transfer: Yes. Patient and family notified of room change. Report given to Caron at bedside. Belongings sent to receiving unit. Accompanied by Registered Nurse. Oriented patient to surroundings. Call light within reach. ID bands double-checked with receiving RN.  Response: Patient tolerated transfer and is stable.

## 2021-02-19 NOTE — DISCHARGE SUMMARY
[facility] Discharge Summary    Ella Palacios MRN# [id number]   Age: 35 year old YOB: 1985     Date of Admission:  2021  Date of Discharge:  2021  Admitting Physician:  Isabel Chaparro MD  Discharge Physician:  SUSIE ERICKSON MD    Admit Dx:   - Intrauterine pregnancy at 37w5d  - History of CS x 1, h/o right cornual wedge resection   - T2DM  - AMA    Discharge Dx:  - Same as above, s/p repeat low transverse  section  - History of CS x 1, h/o right cornual wedge resection   - T2DM  - AMA    Procedures:  - Repeat low transverse  section with double layer uterine closure via Pfannenstiel incision  - Spinal analgesia  - TAP block    Admit HPI:  Ella is a 36 yo  who presented for scheduled repeat  at 37w5d. Please see her admit H&P for full details of her PMH, PSH, Meds, Allergies and exam on admit.    Operative Course:  Surgery was uncomplicated. EBL from the delivery was 824 mL. Please see her  Section Operative Note for full details regarding her delivery.    Operative Findings:   1. Subcutaneous scarring. Fascial thickening, rectus diastasis. No adhesions to the uterus or omental adhesions to the anterior abdominal wall.   2. Clear amniotic fluid  3. Liveborn female infant in cephalic presentation. Apgars 9 at 1 minute & 9 at 5 minutes. Weight 3345 g.  4. Surgically absent right fallopian tube and ovary. Otherwise normal uterus and left ovary.     Postoperative Course:  Her postoperative course was uncomplicated. On POD#2, she was meeting all of her postpartum goals and deemed stable for discharge. She was voiding without difficulty, tolerating a regular diet without nausea and vomiting, her pain was well controlled on oral pain medicines and her lochia was appropriate. Her hemoglobin prior to delivery was 12.5 and after delivery was 10.1. Her Rh status was positive and Rhogam was not indicated. For her  "pregestational diabetes her blood glucoses were monitored postpartum and all within normal range.     Discharge Medications:     Review of your medicines      START taking      Dose / Directions   oxyCODONE 5 MG tablet  Commonly known as: ROXICODONE  Used for: S/P  section      Dose: 5 mg  Take 1 tablet (5 mg) by mouth every 6 hours as needed for pain  Quantity: 10 tablet  Refills: 0        CONTINUE these medicines which have NOT CHANGED      Dose / Directions   Accu-Chek Guide Me w/Device Kit  Used for: GDM (gestational diabetes mellitus)      Dose: 1 Device  1 Device daily  Quantity: 1 kit  Refills: 0     Accu-Chek Guide test strip  Used for: GDM (gestational diabetes mellitus)  Generic drug: blood glucose      Use to test blood sugar 4 times daily or as directed.  Quantity: 150 strip  Refills: 3     acetaminophen 325 MG tablet  Commonly known as: TYLENOL  Used for: Supervision of other normal pregnancy, antepartum      Dose: 650 mg  Take 2 tablets (650 mg) by mouth every 6 hours as needed for mild pain Start after Delivery.  Quantity: 100 tablet  Refills: 0     acetone urine test strip  Commonly known as: KETOSTIX  Used for: GDM (gestational diabetes mellitus)      Dose: 1 strip  1 strip daily Use one strip daily for 1 week then weekly if negative.  Quantity: 50 each  Refills: 3     blood glucose monitoring lancets  Used for: GDM (gestational diabetes mellitus)      Use to test blood sugar 4 times daily or as directed.  Quantity: 102 each  Refills: 3     breast pump Misc  Used for: Supervision of other normal pregnancy, antepartum      Double electric breast pump  Quantity: 1 each  Refills: 0     ibuprofen 600 MG tablet  Commonly known as: ADVIL/MOTRIN  Used for: Supervision of other normal pregnancy, antepartum      Dose: 600 mg  Take 1 tablet (600 mg) by mouth every 6 hours as needed for moderate pain Start after delivery  Quantity: 60 tablet  Refills: 0     insulin syringe-needle U-100 31G X 15/64\" 0.3 " ML  Used for: Insulin controlled gestational diabetes mellitus (GDM) in second trimester      Use 1 syringes daily or as directed.  Quantity: 100 each  Refills: 0     Pen Needles 32G X 4 MM Misc  Used for: Insulin controlled gestational diabetes mellitus (GDM) in second trimester      Dose: 1 each  1 each daily  Quantity: 100 each  Refills: 1     PRENATAL+DHA PO      Refills: 0     senna-docusate 8.6-50 MG tablet  Commonly known as: SENOKOT-S/PERICOLACE  Used for: Supervision of other normal pregnancy, antepartum      Dose: 1 tablet  Take 1 tablet by mouth daily Start after delivery.  Quantity: 100 tablet  Refills: 0        STOP taking    insulin  UNIT/ML vial        NovoLOG FLEXPEN 100 UNIT/ML pen  Generic drug: insulin aspart              Where to get your medicines      Some of these will need a paper prescription and others can be bought over the counter. Ask your nurse if you have questions.    Bring a paper prescription for each of these medications    oxyCODONE 5 MG tablet           Discharge/Disposition:  Ella Palacios was discharged to home in stable condition with the following instructions/medications:  1) Call for temperature > 100.4, bright red vaginal bleeding >1 pad an hour x 2 hours, foul smelling vaginal discharge, pain not controlled by usual oral pain meds, persistent nausea and vomiting not controlled on medications, drainage or redness from incision site  2) She desired Mirena IUD for contraception.  3) For feeding she decided to breastfeed.  4) She was instructed to follow-up with her primary OB in 6 weeks for a routine postpartum visit and GTT  5) Discharge activity:  No heavy lifting >15 lbs or strenuous activity for 6 weeks, pelvic rest for 6 weeks, no driving or operating machinery while on narcotics.    Vania Oliver MD  ObGyn Resident, PGY1  February 21, 2021 7:19 AM        Physician Attestation   I, Karli Olvera, have seen and examined this patient.  I have reviewed  the above note and agree with discharge plan as documented in the above note. Discussed postpartum instructions/restrictions and follow up.       Karli Olvera MD  Date of Service (when I saw the patient): 02/21/21

## 2021-02-19 NOTE — ANESTHESIA PROCEDURE NOTES
Spinal/LP Procedure Note  Spinal Block    Staff -   Anesthesiologist:  Yeni Martinez MD  CRNA: Renata Anaya MD  Performed By: resident  Procedure performed by resident/CRNA in presence of a teaching physician.    Location: OB  Procedure Start/Stop Times:      2/19/2021 8:24 AM     2/19/2021 8:27 AM    Correct Patient: Yes      Correct Position: Yes      Correct Site: Yes      Correct Procedure: Yes      Correct Laterality:  Yes    Site Marked:  Yes  Procedure:     Procedure:  Intrathecal    ASA:  2    Position:  Sitting    Sterile Prep: chloraprep      Insertion site:  L4-5    Approach:  Midline    Needle Type:  Pencan    Needle gauge (G):  25    Local Skin Infiltration:  2% lidocaine    amount (ml):  2    Needle Length (in):  3.5    Introducer used: Yes      Introducer gauge:  20 G    Attempts:  2    Redirects:  1    CSF:  Clear    Paresthesias:  No  Assessment/Narrative:      Level was too low, was only at T8-10 level so we repeated to the spinal

## 2021-02-19 NOTE — ANESTHESIA PROCEDURE NOTES
Pre-Procedure   Staff -   Anesthesiologist:  Yeni Martinez MD  Performed By: anesthesiologist  Location: OB  Pre-Anesthestic Checklist: patient identified, IV checked, site marked, risks and benefits discussed, informed consent, monitors and equipment checked, pre-op evaluation, at physician/surgeon's request and post-op pain management  Timeout:  Correct Patient: Yes   Correct Procedure: Yes   Correct Site: Yes   Correct Position: Yes   Correct Laterality: N/A   Site Marked: N/A    Procedure Documentation  Procedure: TAP  Laterality: bilateral  Patient Position:supine  Patient Prep/Sterile Barriers: sterile gloves, mask, Chloraprep  Insertion Site: T9-10.  Needle Gauge: 21.   Needle Length (millimeters) 110   Ultrasound guided, Ultrasound used to identify targeted nerve, plexus, vascular marker, or fascial plane and place a needle adjacent to it in real-time, Ultrasound was used to visualize the spread of anesthetic in close proximity to the above referenced structure  A permanent image is entered into the patient's record.  The nerve(s) appeared anatomically normal, There were no apparent abnormal pathologic findings    Assessment/Narrative      The placement was negative for: blood aspirated, painful injection and site bleeding  Paresthesias: No.  Bolus given via needle..   Secured via.   Insertion/Infusion Method: Single Shot  Complications: none

## 2021-02-19 NOTE — PROGRESS NOTES
VSS.  Fundus firm, midline, U/1, light bleeding.  2 clots passed, golf ball size and quarter size.  PO miso ordered and given.  BG WNL.  Spinal level slow to descend, level at T10 upon leaving PACU.  Canales removed.  Patient eating ice chips and drinking apple juice.  Stable and transferred to [facility].

## 2021-02-19 NOTE — PROVIDER NOTIFICATION
02/19/21 1015   Provider Notification   Provider Name/Title Dr. Anaya   Method of Notification Phone   Request Evaluate in Person   Notification Reason Vital Signs Change     BP 78/49, mean 60, HR 70s.  Patient denies lightheadedness, dizziness, or nausea.  Notified provider and he arrived at bedside.  Patient placed in trendelenburg position, LR fluid rate increased.  Provider remaining at bedside presently monitoring BPs.  BP 91/60, mean 69.  Plan for patient to remain in trendelenburg position until 1030.

## 2021-02-19 NOTE — PROVIDER NOTIFICATION
02/19/21 1040   Provider Notification   Provider Name/Title Dr. VIRI Lopez   Method of Notification Electronic Page   Request Evaluate-Remote   Notification Reason Status Update     Pt had a gush with fundal check of 100ml containing aquarter and a dollar coin size clot. Firm and no gushing on subsequent checks. Do you want a uterotonic?

## 2021-02-19 NOTE — PROVIDER NOTIFICATION
02/19/21 1200   Provider Notification   Provider Name/Title Dr. Anaya   Method of Notification Phone   Request Evaluate-Remote   Notification Reason Status Update     Spinal level T4, unable to bend ankles or knees.  Pt also feeling cold and shaky.  VSS.  Provider would like patient to remain in L&D PACU until spinal level is T10.    Addendum:  BG checked and WNL.  Patient given apple juice and warming blanket.  Spinal level decreasing and patient reports feeling warmer and no longer shaky.

## 2021-02-19 NOTE — ANESTHESIA POSTPROCEDURE EVALUATION
Patient: Ella Palacios    Procedure(s):   SECTION    Diagnosis:Supervision of other normal pregnancy, antepartum [Z34.80]  Diagnosis Additional Information: No value filed.    Anesthesia Type:  Spinal    Note:  Disposition: Inpatient   Postop Pain Control: Uneventful            Sign Out: Well controlled pain   PONV:    Neuro/Psych: Uneventful            Sign Out: Acceptable/Baseline neuro status   Airway/Respiratory: Uneventful            Sign Out: Acceptable/Baseline resp. status   CV/Hemodynamics: Uneventful            Sign Out: Acceptable CV status   Other NRE: NONE   DID A NON-ROUTINE EVENT OCCUR? YES    Event details/Postop Comments:  Failed spinal by resident repeat spinal successful.  Spinal block took a little longer to resolve however is now at T10 level. Patient moving lower extremities.         Last vitals:  Vitals:    21 1215 21 1230 21 1245   BP: 105/83 (!) 88/52 101/65   Pulse: 89 72 80   Resp:      Temp:   36.9  C (98.4  F)   SpO2: 98% 99% 98%       Last vitals prior to Anesthesia Care Transfer:  CRNA VITALS  2021 0929 - 2021 1029      2021             Pulse:  78    Ht Rate:  86    SpO2:  96 %          Electronically Signed By: Yeni Martinez MD  2021  12:55 PM

## 2021-02-20 LAB
GLUCOSE BLDC GLUCOMTR-MCNC: 121 MG/DL (ref 70–99)
GLUCOSE BLDC GLUCOMTR-MCNC: 93 MG/DL (ref 70–99)
GLUCOSE BLDC GLUCOMTR-MCNC: 96 MG/DL (ref 70–99)
HGB BLD-MCNC: 10.1 G/DL (ref 11.7–15.7)

## 2021-02-20 PROCEDURE — 85018 HEMOGLOBIN: CPT | Performed by: STUDENT IN AN ORGANIZED HEALTH CARE EDUCATION/TRAINING PROGRAM

## 2021-02-20 PROCEDURE — 999N001017 HC STATISTIC GLUCOSE BY METER IP

## 2021-02-20 PROCEDURE — 36415 COLL VENOUS BLD VENIPUNCTURE: CPT | Performed by: STUDENT IN AN ORGANIZED HEALTH CARE EDUCATION/TRAINING PROGRAM

## 2021-02-20 PROCEDURE — 120N000002 HC R&B MED SURG/OB UMMC

## 2021-02-20 PROCEDURE — 250N000011 HC RX IP 250 OP 636: Performed by: STUDENT IN AN ORGANIZED HEALTH CARE EDUCATION/TRAINING PROGRAM

## 2021-02-20 PROCEDURE — 250N000013 HC RX MED GY IP 250 OP 250 PS 637: Performed by: STUDENT IN AN ORGANIZED HEALTH CARE EDUCATION/TRAINING PROGRAM

## 2021-02-20 RX ADMIN — IBUPROFEN 800 MG: 800 TABLET ORAL at 11:32

## 2021-02-20 RX ADMIN — ACETAMINOPHEN 975 MG: 325 TABLET, FILM COATED ORAL at 18:05

## 2021-02-20 RX ADMIN — IBUPROFEN 800 MG: 800 TABLET ORAL at 18:05

## 2021-02-20 RX ADMIN — IBUPROFEN 800 MG: 800 TABLET ORAL at 23:55

## 2021-02-20 RX ADMIN — OXYCODONE HYDROCHLORIDE 5 MG: 5 TABLET ORAL at 15:49

## 2021-02-20 RX ADMIN — KETOROLAC TROMETHAMINE 30 MG: 30 INJECTION, SOLUTION INTRAMUSCULAR; INTRAVENOUS at 05:31

## 2021-02-20 RX ADMIN — ACETAMINOPHEN 975 MG: 325 TABLET, FILM COATED ORAL at 05:31

## 2021-02-20 RX ADMIN — ACETAMINOPHEN 975 MG: 325 TABLET, FILM COATED ORAL at 11:31

## 2021-02-20 RX ADMIN — ACETAMINOPHEN 975 MG: 325 TABLET, FILM COATED ORAL at 23:55

## 2021-02-20 NOTE — PROVIDER NOTIFICATION
02/20/21 0156   Provider Notification   Provider Name/Title G3   Method of Notification Electronic Page   Request Evaluate-Remote   Notification Reason Other   spoke to MD Penny to confirm and review the diabetic orders; advised to get a 0200 BG without correction of insulin and obtain a fasting blood sugar in the am.   Pre prandials not required at this time  Charge EDY Yeh

## 2021-02-20 NOTE — OP NOTE
[facility]   Operative Note      Surgery Date:   2021     Surgeons:         MD Karli Sampson MD --Surgical assistant was vital for retraction, hemostasis and delivery as well as closure due to adhesions from 3 prior abdominal surgeries.    Assistants:       Mary Lopez MD, PGY-2                            Kevin Joyner, MS3     Pre-op Diagnosis:           - Intrauterine pregnancy at 37w5d  - History of CS x 2, h/o right cornual wedge resection   - GDMA2  - AMA     Post-op Diagnosis:         - Same   - Liveborn female infant      Procedure:          Repeat low-transverse  section with double layer uterine closure via pfannenstiel incision     Anesthesia:      Spinal     EBL:     824 mL      IVF:       1100 mL crystalloid     UOP:    300 mL clear urine at the end of the case     Drains: Canales Catheter      Specimens:      None     Complications:  None apparent     Indications:   Ella Palacios is a 35 year old  at 37w5d admitted for scheduled repeat  delivery.  The risks, benefits, and alternatives of  section were discussed with the patient, and she agreed to proceed.      Findings:   1. Subcutaneous scarring. Fascial thickening, rectus diastasis. No adhesions to the uterus or omental adhesions to the anterior abdominal wall.   2. Clear amniotic fluid.   3. Liveborn female infant in cephalic presentation. Apgars 9 at 1 minute & 9 at 5 minutes. Weight 3345 g.      Procedure Details:   The patient was brought to the OR, where adequate spinal anesthesia was administered after two attempts.  She was placed in the dorsal supine position with a slight leftward tilt. She was prepped and draped in the usual sterile fashion. A surgical time out was performed. A pfannenstiel skin incision was made with the scalpel, and carried down to the underlying fascia with sharp and blunt dissection. The fascia was  incised in the midline, and the incision was extended laterally with the Gallardo scissors and electrocautery. The superior aspect of the fascia was grasped with the Kocher clamps and dissected off of the underlying rectus muscles with blunt and sharp dissection. Attention was then turned to the inferior aspect of the fascia, which was similarly dissected off of the underlying rectus muscles. The rectus muscles were  in the midline, and the peritoneum was entered bluntly, and the opening was extended with digital pressure. The bladder blade was placed. A transverse hysterotomy was made with the scalpel in the lower uterine segment, and the incision was extended with digital pressure. The infant was noted to be in the cephalic position, and was delivered atraumatically. The shoulders delivered easily. The cord was doubly clamped and cut, and the infant was handed off to the awaiting nursery staff. A segment of cord was cut. The placenta was delivered with gentle traction on the umbilical cord and uterine massage. The uterus was cleared of all clots and debris. Uterine tone was noted to be firm with 20 units of pitocin given through the running IV and uterine massage.  The hysterotomy was closed with a running locked suture of 0 Monocryl.  The hysterotomy was then imbricated using an 0 Monocryl suture. The hysterotomy was noted to be hemostatic. The hysterotomy was reexamined and noted to be hemostatic. Seprafilm was placed over the hysterotomy. The fascia and rectus muscles were examined and areas of oozing were controlled with electrocautery. Seprafilm was placed over the rectus muscles. The fascia was closed with a running 0 Vicryl suture. The subcutaneous tissue was irrigated and areas of oozing were controlled with electrocautery. The subcutaneous tissue was closed with running 3-0 plain. The skin was closed with 4-0 Monocryl and skin glue.     All sponge, needle, and instrument counts were correct. The  patient tolerated the procedure well, and was transferred to recovery in stable condition. Dr. Chaparro was present and scrubbed for the entirety of the procedure.     Mary Lopez MD  Obstetrics & Gynecology, PGY-2  02/19/21 7:16 PM     I was present and scrubbed for entirety of the surgical case and  agree with note as edited to reflect findings.      SUNIL CHAPARRO MD

## 2021-02-20 NOTE — PLAN OF CARE
Data: Vital signs within normal limits. Postpartum checks within normal limits - see flow record. Patient eating and drinking normally. Patient OOB to void x1, no results, will attempt again. No apparent signs of infection. Incision healing well, some oozing after transfer to postpartum but otherwise WNL. Patient performing self cares and is able to care for infant.  Action: Patient medicated during the shift for pain and cramping. See MAR. Patient reassessed within 1 hour after each medication and pain was improved - patient stated she was comfortable. Patient education done about demo'd hand expression and benefits to mom and baby, breastfeeding positions and latch, postpartum cares. See flow record.  Response: Positive attachment behaviors observed with infant. Support persons  Johny present.   Plan: Anticipate discharge on POD 2-3.

## 2021-02-20 NOTE — PROGRESS NOTES
[facility]   Post-partum Note    Name:  Ella Palacios  MRN: [id number]    S: Patient is doing okay this morning. Didn't get much sleep overnight. Reports increased pain today, just took some medications. Tolerating regular diet without nausea or vomiting.  Ambulating without dizziness.  Spontaneously voiding. Reports flatus.  Lochia appropriate.  Breast feeding.  .     O:   Patient Vitals for the past 24 hrs:   BP Temp Temp src Pulse Resp SpO2   02/19/21 2150 99/56 98.1  F (36.7  C) Oral 78 17 97 %   02/19/21 1730 103/59 -- -- 78 18 97 %   02/19/21 1633 95/60 -- -- 77 18 97 %   02/19/21 1530 96/56 98.3  F (36.8  C) Oral 86 16 97 %   02/19/21 1430 96/68 -- -- 89 16 97 %   02/19/21 1353 103/68 -- -- 88 16 97 %   02/19/21 1324 99/57 100.4  F (38  C) Oral 82 18 97 %   02/19/21 1245 101/65 98.4  F (36.9  C) Oral 80 -- 98 %   02/19/21 1230 (!) 88/52 -- -- 72 -- 99 %   02/19/21 1215 105/83 -- -- 89 -- 98 %   02/19/21 1213 -- 97.9  F (36.6  C) Oral 81 -- --   02/19/21 1200 109/69 -- -- 75 -- 96 %   02/19/21 1145 97/60 97.4  F (36.3  C) Oral 77 -- 97 %   02/19/21 1130 (!) 87/62 -- -- 72 -- 97 %   02/19/21 1115 (!) 87/58 -- -- 80 -- 97 %   02/19/21 1100 (!) 88/58 -- -- 72 -- 97 %   02/19/21 1045 94/54 -- -- 85 -- 97 %   02/19/21 1030 (!) 85/58 -- -- 71 -- 97 %   02/19/21 1021 91/60 -- -- 73 -- 97 %   02/19/21 1018 90/58 -- -- 75 -- 97 %   02/19/21 1015 (!) 78/49 -- -- 77 16 97 %   02/19/21 1006 (!) 84/44 -- -- 81 -- 97 %   02/19/21 1003 (!) 89/53 97.5  F (36.4  C) Oral 82 16 97 %   02/19/21 0712 108/73 97.6  F (36.4  C) Oral -- 16 --   Gen:  Resting comfortably, NAD  CV:  RRR, well perfused  Pulm:  Unlabored breathing on room air  Abd:  Soft, non-distended, appropriately ttp. Fundus below umbilicus, firm and non-tender.  Incision: c/d/i no surrounding redness or erythema  Ext:  non-tender, no edema bilaterally    I/O last 3 completed shifts:  In: 2170 [P.O.:270; I.V.:1900]  Out: 1445  [Urine:400; Blood:1045]    Hgb:   Hemoglobin   Date Value Ref Range Status   2021 10.1 (L) 11.7 - 15.7 g/dL Final       Assessment/Plan:  Ella Palacios is a 35 year old  POD#1 s/p RLTCS for. Doing well in the early postpartum/postoperative period.    #Routine postpartum cares  -PNC:   Rh pos. Rubella equivocal, MMR ordered prior to discharge  -Pain: Adequately controlled with scheduled toradol/tylenol, prn oxycodone. Will transition IV toradol to PO ibuprofen today.  -Hgb: Hgb 12.5>>10.1. No symptoms of anemia, vital signs stable.   -GI Tolerating regular diet. Continue scheduled bowel regimen. Prn antiemetics  - s/p alejandre. Voiding spontaneously  -PPX Encourage ambulation, IS.   -Feed: Plans to breast feed  -BC: mIUD    #Pregestational diabetes  -Diagnosed by failed early GCT  -PTA insulin regimen held  -Postpartum BG have been normal   -Plan for 2 hour GTT at 6 week postpartum visit    Dispo: DC to home likely POD#2-3 when meeting post-operative goals    Omar Penny MD  Ob/Gyn PGY-3  2021 8:27 AM    Patient seen and examined by me, agree with above resident note. Overall doing well.  Some discomfort overnight, was able to catch up with pain meds and under control now.  BS stable.  Ambulation encouraged.  Cont routine cares    OMAR ISRAEL MD

## 2021-02-20 NOTE — PLAN OF CARE
VSS, postpartum assessment WNL, voided x1 after initially needing straight cath, + flatus, pain managed well with toradol and tylenol-knows oxycodone is available if needed.  Tolerating PO well.  Breastfeeding independently.  Pt states she had breast implants placed on 2017 (after her last child was born) but the incisions are non-areolar. She  her other 3 children successfully.  Blood glucose checks were between  overnight, not requiring sliding scale insulin.  Pt denies questions or concerns at this time.  Will continue with current plan of care.

## 2021-02-21 VITALS
DIASTOLIC BLOOD PRESSURE: 68 MMHG | OXYGEN SATURATION: 98 % | HEART RATE: 75 BPM | RESPIRATION RATE: 16 BRPM | SYSTOLIC BLOOD PRESSURE: 103 MMHG | TEMPERATURE: 98.6 F

## 2021-02-21 PROCEDURE — 250N000011 HC RX IP 250 OP 636: Performed by: STUDENT IN AN ORGANIZED HEALTH CARE EDUCATION/TRAINING PROGRAM

## 2021-02-21 PROCEDURE — 90471 IMMUNIZATION ADMIN: CPT | Performed by: STUDENT IN AN ORGANIZED HEALTH CARE EDUCATION/TRAINING PROGRAM

## 2021-02-21 PROCEDURE — 250N000013 HC RX MED GY IP 250 OP 250 PS 637: Performed by: STUDENT IN AN ORGANIZED HEALTH CARE EDUCATION/TRAINING PROGRAM

## 2021-02-21 PROCEDURE — 90707 MMR VACCINE SC: CPT | Performed by: STUDENT IN AN ORGANIZED HEALTH CARE EDUCATION/TRAINING PROGRAM

## 2021-02-21 RX ORDER — OXYCODONE HYDROCHLORIDE 5 MG/1
5 TABLET ORAL EVERY 6 HOURS PRN
Qty: 10 TABLET | Refills: 0 | Status: SHIPPED | OUTPATIENT
Start: 2021-02-21 | End: 2021-02-24

## 2021-02-21 RX ADMIN — OXYCODONE HYDROCHLORIDE 5 MG: 5 TABLET ORAL at 17:26

## 2021-02-21 RX ADMIN — MEASLES, MUMPS, AND RUBELLA VIRUS VACCINE LIVE 0.5 ML: 1000; 12500; 1000 INJECTION, POWDER, LYOPHILIZED, FOR SUSPENSION SUBCUTANEOUS at 12:36

## 2021-02-21 RX ADMIN — IBUPROFEN 800 MG: 800 TABLET ORAL at 18:50

## 2021-02-21 RX ADMIN — IBUPROFEN 800 MG: 800 TABLET ORAL at 12:36

## 2021-02-21 RX ADMIN — OXYCODONE HYDROCHLORIDE 5 MG: 5 TABLET ORAL at 12:36

## 2021-02-21 RX ADMIN — ACETAMINOPHEN 975 MG: 325 TABLET, FILM COATED ORAL at 12:36

## 2021-02-21 RX ADMIN — IBUPROFEN 800 MG: 800 TABLET ORAL at 06:29

## 2021-02-21 RX ADMIN — ACETAMINOPHEN 975 MG: 325 TABLET, FILM COATED ORAL at 18:49

## 2021-02-21 RX ADMIN — OXYCODONE HYDROCHLORIDE 5 MG: 5 TABLET ORAL at 06:29

## 2021-02-21 RX ADMIN — ACETAMINOPHEN 975 MG: 325 TABLET, FILM COATED ORAL at 06:29

## 2021-02-21 NOTE — PLAN OF CARE
Data: VSS, postpartum assessments WNL. She is voiding without difficulty, up ad christopher, passing gas, eating and drinking normally. Incision appears to be healing well, lochia WNL, no s/s infection. Breastfeeding infant and pumping with assistance.  Taking ibuprofen and tylenol for pain and using marisol bottle and abdominal binder. Reports good pain management.   Action: Education provided on plan of care,  cares,  bilirubin therapy best practices  Response: Pt is agreeable with her plan of care. Positive attachment behaviors observed with infant. Support person,  Johny, present. Continue with plan of care.

## 2021-02-21 NOTE — PLAN OF CARE
VSS. Fundus midline, firm, and U/2. Scant lochia. Incision approximated with liquid glue; no drainage noted. Pain adequately managed with ibuprofen and tylenol. Ambulating independently, showered this shift. tolerating regular diet, and voiding without difficulty. Breastfeeding on cue and pumping independently with encouragement. Baby under bili light d/t high intermediate risk bili serum X2 with janelle +2. Bonding well with . Continue with plan of care.

## 2021-02-21 NOTE — PLAN OF CARE
VSS. Fundus midline, firm, and U/2. Scant lochia. Incision approximated with liquid bandage; no drainage. Pain adequately managed with tylenol, ibuprofen, and oxycodone. Ambulating independently, tolerating regular diet, and voiding without difficulty. Breastfeeding independently with good latch. Bonding well with . Continue with plan of care.

## 2021-02-21 NOTE — PROGRESS NOTES
[facility]   Post-partum Note    Name:  Ella Palacios  MRN: [id number]    S: Patient is sitting on bed breast pumping. Doing well overall. A little incisional pain that is well controlled. Tolerating regular diet without n/v. Lochia improving and similar to menses. Passing gas, no BM yet. Ambulating without dizziness. Breast feeing going well.    O:   Patient Vitals for the past 24 hrs:   BP Temp Temp src Pulse Resp SpO2   21 0030 104/73 97.8  F (36.6  C) Oral 70 16 --   21 1556 104/67 97.9  F (36.6  C) Oral 74 16 98 %   21 1132 99/62 98.1  F (36.7  C) Oral 72 18 97 %   Gen:  Resting comfortably, NAD  CV:  Regular rate, well perfused  Pulm:  Unlabored breathing on room air  Abd:  Soft, non-distended, appropriately ttp. Fundus below umbilicus, firm and non-tender.  Incision: c/d/i no surrounding redness or erythema  Ext:  non-tender, no edema bilaterally    I/O last 3 completed shifts:  In: -   Out: 550 [Urine:550]    Hgb:   Hemoglobin   Date Value Ref Range Status   2021 10.1 (L) 11.7 - 15.7 g/dL Final       Assessment/Plan:  Ella Palacios is a 35 year old  POD#2 s/p RLTCS for. Doing well in the early postpartum/postoperative period.    #Routine postpartum cares  -PNC:   Rh pos. Rubella equivocal, MMR ordered prior to discharge  -Pain: Adequately controlled with scheduled toradol/tylenol, prn oxycodone. Will transition IV toradol to PO ibuprofen today.  -Hgb: Hgb 12.5>>10.1. No symptoms of anemia, vital signs stable.   -GI Tolerating regular diet. Continue scheduled bowel regimen. Prn antiemetics  - s/p alejandre. Voiding spontaneously  -PPX Encourage ambulation, IS.   -Feed: Plans to breast feed  -BC: mIUD    #Pregestational diabetes  -Diagnosed by failed early GCT  -PTA insulin regimen held  -Postpartum BG have been normal  -Plan for 2 hour GTT at 6 week postpartum visit    Dispo: DC to home likely POD#2  when meeting post-operative  goals    Vania Oliver MD  ObGyn Resident, PGY1  February 21, 2021 7:17 AM        Physician Attestation   I, Karli Olvera, personally saw and evaluated Ella DARIUS Palacios.  I have reviewed the above note and agree with details and plan for discharge.  Discussed postpartum instructions/restrictions and follow up.     Karli Olvera MD  February 21, 2021

## 2021-02-22 NOTE — PLAN OF CARE
VSS. Fundus midline, firm, and U/2. Scant lochia. Incision approximated; no drainage noted. Pain adequately managed with oxycodone this shift. Ambulating independently, tolerating regular diet, and voiding without difficulty. Breastfeeding independently and supplementing  with pumped maternal milk. Bonding well with .  Pt met discharge protocol. Medication regimen and follow up with PCP appointments discussed with patient and spouse. Verbalized understanding of discharge teaching and had pt to teach back. Pt discharged home accompanied by her spouse and  baby.

## 2021-04-01 ENCOUNTER — PRENATAL OFFICE VISIT (OUTPATIENT)
Dept: OBGYN | Facility: CLINIC | Age: 36
End: 2021-04-01
Attending: ADVANCED PRACTICE MIDWIFE
Payer: COMMERCIAL

## 2021-04-01 VITALS
DIASTOLIC BLOOD PRESSURE: 77 MMHG | BODY MASS INDEX: 24.36 KG/M2 | WEIGHT: 137.5 LBS | SYSTOLIC BLOOD PRESSURE: 116 MMHG | HEIGHT: 63 IN | HEART RATE: 59 BPM

## 2021-04-01 DIAGNOSIS — Z30.430 ENCOUNTER FOR INSERTION OF MIRENA IUD: ICD-10-CM

## 2021-04-01 DIAGNOSIS — Z30.430 ENCOUNTER FOR INSERTION OF INTRAUTERINE CONTRACEPTIVE DEVICE: Primary | ICD-10-CM

## 2021-04-01 PROCEDURE — 250N000011 HC RX IP 250 OP 636: Performed by: ADVANCED PRACTICE MIDWIFE

## 2021-04-01 PROCEDURE — G0463 HOSPITAL OUTPT CLINIC VISIT: HCPCS

## 2021-04-01 PROCEDURE — 58300 INSERT INTRAUTERINE DEVICE: CPT | Performed by: ADVANCED PRACTICE MIDWIFE

## 2021-04-01 RX ADMIN — LEVONORGESTREL 20 MCG: 52 INTRAUTERINE DEVICE INTRAUTERINE at 16:41

## 2021-04-01 ASSESSMENT — ANXIETY QUESTIONNAIRES
2. NOT BEING ABLE TO STOP OR CONTROL WORRYING: NOT AT ALL
IF YOU CHECKED OFF ANY PROBLEMS ON THIS QUESTIONNAIRE, HOW DIFFICULT HAVE THESE PROBLEMS MADE IT FOR YOU TO DO YOUR WORK, TAKE CARE OF THINGS AT HOME, OR GET ALONG WITH OTHER PEOPLE: NOT DIFFICULT AT ALL
3. WORRYING TOO MUCH ABOUT DIFFERENT THINGS: NOT AT ALL
GAD7 TOTAL SCORE: 0
7. FEELING AFRAID AS IF SOMETHING AWFUL MIGHT HAPPEN: NOT AT ALL
1. FEELING NERVOUS, ANXIOUS, OR ON EDGE: NOT AT ALL
6. BECOMING EASILY ANNOYED OR IRRITABLE: NOT AT ALL
5. BEING SO RESTLESS THAT IT IS HARD TO SIT STILL: NOT AT ALL

## 2021-04-01 ASSESSMENT — PATIENT HEALTH QUESTIONNAIRE - PHQ9
SUM OF ALL RESPONSES TO PHQ QUESTIONS 1-9: 0
5. POOR APPETITE OR OVEREATING: NOT AT ALL

## 2021-04-01 ASSESSMENT — PAIN SCALES - GENERAL: PAINLEVEL: NO PAIN (0)

## 2021-04-01 ASSESSMENT — MIFFLIN-ST. JEOR: SCORE: 1277.83

## 2021-04-01 NOTE — NURSING NOTE
SUBJECTIVE:   Ella Palacios is here for her 6-week postpartum checkup.     PHQ-9 score:    Hx of Abuse:       Delivery Date: 21.    Delivering provider:  Dr. JULIO Chaparro.    Type of delivery:  .     Delivery complications: None  Infant gender:  girl, weight 7 pounds 6 oz.  Feeding Method:   and Bottlefed.  Complications reported with feeding:  Milk supply concern.    Bleeding:  light.  Duration:   .  Menses resumed:  No  Bowel/Urinary problems:  No    Contraception Planned:  IUD Mirena  She  has not had intercourse since delivery..

## 2021-04-01 NOTE — PROGRESS NOTES
"Nursing Notes:   Cindy Willett LPN  2021  2:11 PM  Signed  SUBJECTIVE:   Ella Palacios is here for her 6-week postpartum checkup.     PHQ-9 score:    Hx of Abuse:       Delivery Date: 21.    Delivering provider:  Dr. JULIO Chaparro.    Type of delivery:  .     Delivery complications: None  Infant gender:  girl, weight 7 pounds 6 oz.  Feeding Method:   and Bottlefed.  Complications reported with feeding:  Milk supply concern.    Bleeding:  light.  Duration:   .  Menses resumed:  No  Bowel/Urinary problems:  No    Contraception Planned:  IUD Mirena  She  has not had intercourse since delivery..           ================================================================  ROS: 10 point ROS neg other than the symptoms noted above in the HPI.     Review Of Systems  Skin: negative  Eyes: negative for vision changes.  Respiratory: No shortness of breath or difficulty breathing.  Cardiovascular: negative for, palpitations and chest pain  Gastrointestinal: negative for, constipation and diarrhea  Genitourinary: negative for, dysuria, frequency and vaginal discharge  Musculoskeletal: negative  Neurologic: negative for headaches, syncope, numbness or tingling, and incoordination  Psychiatric: negative for, sleep disturbance, anxiety, depression, thoughts of self-harm, thoughts of hurting someone else and hallucinations  Hematologic/Lymphatic/Immunologic: negative for, allergies and bleeding disorder  Endocrine: positive for increased diaphoresis since this pregnancy; negative for thyroid disorder, polyphagia, polydipsia and polyuria.      EXAM:  /77   Pulse 59   Ht 1.6 m (5' 3\")   Wt 62.4 kg (137 lb 8 oz)   LMP 2020   BMI 24.36 kg/m      General: healthy, alert and no distress  Psych: negative for anxiety, depression, thoughts of self-harm, thoughts of hurting someone else and hallucinations  Last PHQ-9 score on record= 0  Breasts:  Lactating  Abdomen: Diastasis 2 FB  Incision:  " "well healed   Vulva:  Normal genitalia and Bartholin's, Urethra, Darby's normal  Vagina:  normal with good muscle tone  Cervix:  no lesions and pink, moist, closed, without lesion or CMT.    Uterus:  fully involuted and non-tender    Adnexa:  Within normal limits and No masses, nodularity, tenderness  Recto-vaginal:   anus normal      IUD Insertion Note:    Is a pregnancy test required: No.  Was a consent obtained?  Yes    Subjective: Ella Palacios is a 36 year old  presents for IUD and desires Mirena type IUD.    Patient has been given the opportunity to ask questions about all forms of birth control, including all options appropriate for Ella Palacios. Discussed that no method of birth control, except abstinence is 100% effective against pregnancy or sexually transmitted infection.     Ella Palacios understands she may have the IUD removed at any time. IUD should be removed by a health care provider.    The entire insertion procedure was reviewed with the patient, including care after placement.  Time Out - \"Pause for the Cause\"  Just before the procedure begins, through verbal and active participation of team members, verify:    Initials   Patient Name    VR   Patient Date of Birth 1985   Procedure to be performed  IUD Insertion     Consent:  Risks, benefits of treatment, and no treatment were discussed.  Patient's questions were elicited and answered. , Written consent signed and scanned into medical record. and Patient received and verbalized understanding of discharge instructions      Reason for Insertion: contraception    Premedicated with ibuprofen.  Under sterile technique, cervix was visualized with speculum and prepped with Betadine solution swab x 3. Tenaculum was placed for stability. The uterus was gently straightened and sounded to 8.0 cm. IUD prepared for placement, and IUD inserted according to 's instructions without difficulty or significant resitance, and " deployed at the fundus. The strings were visualized and trimmed to 3.0 cm from the external os. Tenaculum was removed and hemostasis noted. Speculum removed.  Patient tolerated procedure well.    Lot # [id number]  Exp: 2023    EBL: minimal    Complications: none      ASSESSMENT: 36 year old  seen in clinic nearly 6 weeks s/p planned repeat  here for Mirena IUD placement and pp follow up.    Encounter Diagnoses   Name Primary?     Encounter for insertion of mirena IUD      Encounter for insertion of intrauterine contraceptive device Yes      Normal postpartum exam after repeat c/s    Pregnancy was complicated by:  Gestational Diabetes - diet controlled.      PLAN:  Orders Placed This Encounter   Procedures     INSERTION INTRAUTERINE DEVICE      Orders Placed This Encounter   Medications     levonorgestrel (MIRENA) 20 MCG/24HR IUD     Si each (20 mcg) by Intrauterine route once     Dispense:        levonorgestrel (MIRENA) 20 MCG/24HR IUD 20 mcg     Given 's handouts, including when to have IUD removed, list of danger s/sx, side effects and follow up recommended. Encouraged condom use for prevention of STD. Back up contraception advised for 7 days if progestin method. Advised to call for any fever, for prolonged or severe pain or bleeding, abnormal vaginal discharge, or unable to palpate strings. She was advised to use pain medications (ibuprofen) as needed for mild to moderate pain. Advised to follow-up in clinic in 4-6 weeks for IUD string check if unable to find strings or as directed by provider.   Lactation consultation discussed and contact information provided.  Plan to follow up with Dr. Chaparro 4-6 weeks:  - IUD check up  - fasting 2hr GTT  - PT referral for diastasis recovery      Patient seen and plan of care discussed with FABIAN Rangel CNM.  Alondra Goldberg, MS4  I agree with the PFSH and ROS as completed by the student, except for changes made by me. The remainder  of the encounter was performed by me and scribed by the student. The scribed note accurately reflects my personal services and decisions made by me.  Debra Chacon, DNP, CNM, APRN

## 2021-04-02 ASSESSMENT — ANXIETY QUESTIONNAIRES: GAD7 TOTAL SCORE: 0

## 2021-04-11 ENCOUNTER — HEALTH MAINTENANCE LETTER (OUTPATIENT)
Age: 36
End: 2021-04-11

## 2021-07-26 ENCOUNTER — OFFICE VISIT (OUTPATIENT)
Dept: OBGYN | Facility: CLINIC | Age: 36
End: 2021-07-26
Payer: COMMERCIAL

## 2021-07-26 VITALS
BODY MASS INDEX: 25.21 KG/M2 | OXYGEN SATURATION: 98 % | DIASTOLIC BLOOD PRESSURE: 83 MMHG | SYSTOLIC BLOOD PRESSURE: 121 MMHG | WEIGHT: 142.3 LBS | HEART RATE: 71 BPM

## 2021-07-26 DIAGNOSIS — Z30.431 IUD CHECK UP: Primary | ICD-10-CM

## 2021-07-26 PROBLEM — Z23 NEED FOR TDAP VACCINATION: Status: RESOLVED | Noted: 2020-07-14 | Resolved: 2021-07-26

## 2021-07-26 PROBLEM — Z30.430 ENCOUNTER FOR INSERTION OF MIRENA IUD: Status: RESOLVED | Noted: 2021-04-01 | Resolved: 2021-07-26

## 2021-07-26 PROBLEM — Z34.80 SUPERVISION OF OTHER NORMAL PREGNANCY, ANTEPARTUM: Status: RESOLVED | Noted: 2020-08-10 | Resolved: 2021-07-26

## 2021-07-26 PROCEDURE — 99212 OFFICE O/P EST SF 10 MIN: CPT | Performed by: OBSTETRICS & GYNECOLOGY

## 2021-07-26 NOTE — PROGRESS NOTES
CC:  Check IUD  HPI:  Ella Palacios is a 36 year old female No LMP recorded. (Menstrual status: Postpartum).  Had mirena IUD placed on 3rd floor as needed to get in ASAP. (4/2021) doing well and has stopped breastfeeding. Having some occasional spotting but no bleeding.     Allergies: Patient has no known allergies.    The patient's past medical history, social history and family history is reviewed at our visit and updated in EPIC.    EXAM:  Blood pressure 121/83, pulse 71, weight 64.5 kg (142 lb 4.8 oz), SpO2 98 %, not currently breastfeeding.  General - pleasant female in no acute distress.  Abdomen - soft, nontender, nondistended, incision well healed.  Pelvic - EG: normal adult female, BUS: within normal limits, Vagina: well rugated, no discharge, Cervix: no lesions or CMT,IUD strings at os  Rectovaginal - deferred.  Psychiactric - appropriate mood and affect  Neurological - alert and oriented X 3    ASSESSMENT/PLAN:  (Z30.217) IUD check up  (primary encounter diagnosis)  Comment: mirena 4/2021  Plan: discussed normal bleeding pattern with mirena and answered questions. Plan RTC one year for annual exam and check fasting cholesterol and diabetes then.    Isabel Chaparro MD

## 2021-09-25 ENCOUNTER — HEALTH MAINTENANCE LETTER (OUTPATIENT)
Age: 36
End: 2021-09-25

## 2022-05-26 NOTE — TELEPHONE ENCOUNTER
Pending Prescriptions:                       Disp   Refills    norgestimate-ethinyl estradiol (ORTHO-CYC*84 tab*3            Sig: Take 1 tablet by mouth daily    Patient calling to get refill on OCP. Has appt on 1/5/18 with [facility]. Refill sent to pharmacy.   Paz Jessica, RN-BSN     Within this Telehealth Consent, the terms you and yours refer to the person using the Telehealth Service (Service), or in the case of a use of the Service by or on behalf of a minor, you and yours refer to and include (i) the parent or legal guardian who provides consent to the use of the Service by such minor or uses the Service on behalf of such minor, and (ii) the minor for whom consent is being provided or on whose behalf the Service is being utilized. When using Service, you will be consulting with your health care providers via the use of Telehealth.   Telehealth involves the delivery of healthcare services using electronic communications, information technology or other means between a healthcare provider and a patient who are not in the same physical location. Telehealth may be used for diagnosis, treatment, follow-up and/or patient education, and may include, but is not limited to, one or more of the following:    Electronic transmission of medical records, photo images, personal health information or other data between a patient and a healthcare provider    Interactions between a patient and healthcare provider via audio, video and/or data communications    Use of output data from medical devices, sound and video files    Anticipated Benefits   The use of Telehealth by your Provider(s) through the Service may have the following possible benefits:    Making it easier and more efficient for you to access medical care and treatment for the conditions treated by such Provider(s) utilizing the Service    Allowing you to obtain medical care and treatment by Provider(s) at times that are convenient for you    Enabling you to interact with Provider(s) without the necessity of an in-office appointment     Possible Risks   While the use of Telehealth can provide potential benefits for you, there are also potential risks associated with the use of Telehealth.  These risks include, but may not be limited to the following:    Your Provider(s) may not able to provide medical treatment for your particular condition and you may be required to seek alternative healthcare or emergency care services.  The electronic systems or other security protocols or safeguards used in the Service could fail, causing a breach of privacy of your medical or other information.  Given regulatory requirements in certain jurisdictions, your Provider(s) diagnosis and/or treatment options, especially pertaining to certain prescriptions, may be limited. Acceptance   1. You understand that Services will be provided via Telehealth. This process involves the use of HIPAA compliant and secure, real-time audio-visual interfacing with a qualified and appropriately trained provider located at [facility]. 2. You understand that, under no circumstances, will this session be recorded. 3. You understand that the Provider(s) at [facility] and other clinical participants will be party to the information obtained during the Telehealth session in accordance with best medical practices. 4. You understand that the information obtained during the Telehealth session will be used to help determine the most appropriate treatment options. 5. You understand that You have the right to revoke this consent at any point in time. 6. You understand that Telehealth is voluntary, and that continued treatment is not dependent upon consent. 7. You understand that, in the event of non-consent to Telehealth services and/or technical difficulties, you will obtain services as typically provided in the absence of Telehealth technology. 8. You understand that this consent will be kept in Your medical record. 9. No potential benefits from the use of Telehealth or specific results can be guaranteed. Your condition may not be cured or improved and, in some cases, may get worse.    10. There are limitations in the provision of medical care and treatment via Telehealth and the Service and you may not be able to receive diagnosis and/or treatment through the Service for every condition for which you seek diagnosis and/or treatment. 11. There are potential risks to the use of Telehealth, including but not limited to the risks described in this Telehealth Consent. 12. Your Provider(s) have discussed the use of Telehealth and the Service with you, including the benefits and risks of such and you have provided oral consent to your Provider(s) for the use of Telehealth and the Service. 15. You understand that it is your duty to provide your Provider(s) truthful, accurate and complete information, including all relevant information regarding care that you may have received or may be receiving from other healthcare providers outside of the Service. 14. You understand that each of your Provider(s) may determine in his or sole discretion that your condition is not suitable for diagnosis and/or treatment using the Service, and that you may need to seek medical care and treatment a specialist or other healthcare provider, outside of the Service. 15. You understand that you are fully responsible for payment for all services provided by Provider(s) or through use of the Service and that you may not be able to use third-party insurance. 16. You represent that (a) you have read this Telehealth Consent carefully, (b) you understand the risks and benefits of the Service and the use of Telehealth in the medical care and treatment provided to you by Provider(s) using the Service, and (c) you have the legal capacity and authority to provide this consent for yourself and/or the minor for which you are consenting under applicable federal and state laws, including laws relating to the age of [de-identified] and/or parental/guardian consent.    17. You give your informed consent to the use of Telehealth by Provider(s) using the Service under the terms described in the Terms of Service and this Telehealth Consent. The patient was read the following statement and has consented to the visit as of 5/26/22. The patient has been scheduled for their first telehealth visit on 5.26.22 with Dr. Rosetta White.

## 2022-08-27 ENCOUNTER — HEALTH MAINTENANCE LETTER (OUTPATIENT)
Age: 37
End: 2022-08-27

## 2023-01-07 ENCOUNTER — HEALTH MAINTENANCE LETTER (OUTPATIENT)
Age: 38
End: 2023-01-07

## 2023-07-10 NOTE — PROGRESS NOTES
Ella is a 31 year old  female who presents for annual exam.     Menses are irregular and normal lasting  days.  Menses flow: normal and heavy.  No LMP recorded.. Using oral contraceptives for contraception.  She is not currently considering pregnancy.  Besides routine health maintenance, she has no other health concerns today .   Stopped breastfeeding in November and now having monthly cycles on minipill.  Doing well with 3 boys at home, oldest is 9 and in 3rd grade, youngest is 14 months. Happy.  GYNECOLOGIC HISTORY:  Menarche  Ella is sexually active with 1male partner(s) and is currently in monogamous relationship.    History sexually transmitted infections:Trichomonas 9yrs ago  STI testing offered?  declined  KANA exposure: Unknown  History of abnormal Pap smear: NO - age 30- 65 PAP every 3 years recommended  Family history of breast CA: Yes (Please explain): mgm  Family history of uterine/ovarian CA: No    Family history of colon CA: No    HEALTH MAINTENANCE:  Cholesterol: (  CHOLESTEROL   Date Value Ref Range Status   2014 164 <200 mg/dL Final     Comment:     LDL Cholesterol is the primary guide to therapy.   The NCEP recommends further evaluation of: patients with cholesterol greater   than 200 mg/dL if additional risk factors are present, cholesterol greater   than   240 mg/dL, triglycerides greater than 150 mg/dL, or HDL less than 40 mg/dL.      History of abnormal lipids: No  Mammo: na . History of abnormal Mammo: Not applicable.  Regular Self Breast Exams: No  Calcium/Vitamin D intake: source:  dairy Adequate? Yes  TSH: (No results found for: TSH )  Pap; (PAP      NIL   2015  PAP      NIL   2012 )    HISTORY:  Obstetric History       T3      TAB0   SAB0   E1   M0   L3       # Outcome Date GA Lbr Rakesh/2nd Weight Sex Delivery Anes PTL Lv   4 Term 11/23/15 37w2d  7 lb 10 oz (3.459 kg) M CS-LTranv Spinal N Y      Name: Joe      Apgar1:  8                Apgar5: 8    3 Term 12 37w3d  8 lb 8 oz (3.856 kg) M CS-LTranv Spinal N Y      Name: Cristian      Apgar1:  8                Apgar5: 9   2 Ectopic 11           1 Term  40w0d  8 lb 2 oz (3.685 kg) M Vag-Vacuum EPI  Y      Name: Constantine        Past Medical History   Diagnosis Date     Diabetes mellitus of mother, complicating pregnancy, childbirth, or the puerperium, unspecified as to episode of care(648.00) 2015     Gestational diabetes--glyburide     Past Surgical History   Procedure Laterality Date     Laparoscopy diagnostic (gyn)  2011     Procedure:LAPAROSCOPY DIAGNOSTIC (GYN); Surgeon:ISABEL CHAPARRO; Location:[facility] OR     Laparotomy exploratory  2011     cornual ectopic with wedge resection      section  2012     Procedure:  SECTION;   section ;  Surgeon: Isabel Chaparro MD;  Location: [facility] L+D      section N/A 2015     Procedure:  SECTION;  Surgeon: Isabel Chaparro MD;  Location: [facility] L+D     Family History   Problem Relation Age of Onset     C.A.D. Paternal Grandfather      Breast Cancer Maternal Grandmother      in later 50's     CEREBROVASCULAR DISEASE Paternal Grandfather      Social History     Social History     Marital Status:      Spouse Name: N/A     Number of Children: 3     Years of Education: N/A     Occupational History      None      Social History Main Topics     Smoking status: Never Smoker      Smokeless tobacco: Never Used     Alcohol Use: No     Drug Use: No     Sexual Activity:     Partners: Male     Birth Control/ Protection:      Other Topics Concern     Not on file         Current outpatient prescriptions:      norethindrone (MICRONOR) 0.35 MG per tablet, Take 1 tablet (0.35 mg) by mouth daily, Disp: 84 tablet, Rfl: 0  No current facility-administered medications for this visit.    Facility-Administered Medications Ordered in Other Visits:      bupivacaine 0.25 % - EPINEPHrine 1:200,000 injection, , ,  "PRN, Weiland, Brian Forrester MD, 40 mL at 11/23/15 1000   No Known Allergies    Past medical, surgical, social and family history were reviewed and updated in EPIC.    EXAM:  /73 mmHg  Pulse 64  Temp(Src) 97.2  F (36.2  C) (Oral)  Ht 5' 3\" (1.6 m)  Wt 128 lb (58.06 kg)  BMI 22.68 kg/m2  SpO2 97%   BMI: Body mass index is 22.68 kg/(m^2).  Constitutional: healthy, alert and no distress  Head: Normocephalic. No masses, lesions, tenderness or abnormalities  Neck: Neck supple. Trachea midline. No adenopathy. Thyroid symmetric, normal size.   Cardiovascular: RRR.   Respiratory: Negative.   Breast: Breasts reveal mild symmetric fibrocystic densities, but there are no dominant, discrete, fixed or suspicious masses found.  Gastrointestinal: Abdomen soft, non-tender, non-distended. No masses, organomegaly.  : deferred  Musculoskeletal: extremities normal  Skin: no suspicious lesions or rashes  Psychiatric: Affect appropriate, cooperative,mentation appears normal.     COUNSELING:   Reviewed preventive health counseling, as reflected in patient instructions       Contraception   reports that she has never smoked. She has never used smokeless tobacco.    Body mass index is 22.68 kg/(m^2).    FRAX Risk Assessment    ASSESSMENT:  31 year old female with satisfactory annual exam  (Z01.419) Encounter for gynecological examination without abnormal finding  (primary encounter diagnosis)  Comment: on minipill  Plan: norgestimate-ethinyl estradiol (ORTHO-CYCLEN,         SPRINTEC) 0.25-35 MG-MCG per tablet, CBC with         platelets        Discussed change to regular OCP and she will start that now.  Stop minipill.  Due for pap smear next year.    (Z13.1) Screening for diabetes mellitus  Comment: GDM in pregnancies  Plan: Glucose, Hemoglobin A1c        Check labs fasting today    (Z13.220) Screening for lipoid disorders  Plan: Lipid Profile        Check fasting labs today.      Isabel Chaparro MD    " Admitted

## 2023-09-23 ENCOUNTER — HEALTH MAINTENANCE LETTER (OUTPATIENT)
Age: 38
End: 2023-09-23

## 2024-04-20 ENCOUNTER — HEALTH MAINTENANCE LETTER (OUTPATIENT)
Age: 39
End: 2024-04-20

## (undated) DEVICE — ESU GROUND PAD UNIVERSAL W/O CORD

## (undated) DEVICE — SU MONOCRYL 0 CT-1 36" Y346H

## (undated) DEVICE — PACK C-SECTION LF PL15OTA83B

## (undated) DEVICE — STRAP KNEE/BODY 31143004

## (undated) DEVICE — DRAPE SETUP C-SECTION INVISISHIELD 54X90" DYNJE5600

## (undated) DEVICE — PREP CHLORAPREP 26ML TINTED ORANGE  260815

## (undated) DEVICE — SOL WATER IRRIG 1000ML BOTTLE 07139-09

## (undated) DEVICE — ADH SKIN CLOSURE PREMIERPRO EXOFIN 1.0ML 3470

## (undated) DEVICE — SOL NACL 0.9% IRRIG 1000ML BOTTLE 07138-09

## (undated) DEVICE — GLOVE PROTEXIS BLUE W/NEU-THERA 6.5  2D73EB65

## (undated) DEVICE — CATH TRAY FOLEY 16FR BARDEX W/DRAIN BAG STATLOCK 300316A

## (undated) DEVICE — SU VICRYL 0 CT-1 36" J346H

## (undated) DEVICE — STOCKING SLEEVE COMPRESSION CALF LG

## (undated) DEVICE — GLOVE ESTEEM POWDER FREE SMT 6.0  2D72PT60

## (undated) RX ORDER — FENTANYL CITRATE-0.9 % NACL/PF 10 MCG/ML
PLASTIC BAG, INJECTION (ML) INTRAVENOUS
Status: DISPENSED
Start: 2021-02-19

## (undated) RX ORDER — MORPHINE SULFATE 1 MG/ML
INJECTION, SOLUTION EPIDURAL; INTRATHECAL; INTRAVENOUS
Status: DISPENSED
Start: 2021-02-19

## (undated) RX ORDER — FENTANYL CITRATE 50 UG/ML
INJECTION, SOLUTION INTRAMUSCULAR; INTRAVENOUS
Status: DISPENSED
Start: 2021-02-19

## (undated) RX ORDER — MISOPROSTOL 200 UG/1
TABLET ORAL
Status: DISPENSED
Start: 2021-02-19

## (undated) RX ORDER — OXYTOCIN/0.9 % SODIUM CHLORIDE 30/500 ML
PLASTIC BAG, INJECTION (ML) INTRAVENOUS
Status: DISPENSED
Start: 2021-02-19